# Patient Record
Sex: FEMALE | Race: ASIAN | Employment: OTHER | URBAN - METROPOLITAN AREA
[De-identification: names, ages, dates, MRNs, and addresses within clinical notes are randomized per-mention and may not be internally consistent; named-entity substitution may affect disease eponyms.]

---

## 2017-08-04 ENCOUNTER — HOSPITAL ENCOUNTER (OUTPATIENT)
Dept: MAMMOGRAPHY | Age: 72
Discharge: HOME | End: 2017-08-04
Attending: GENERAL PRACTICE
Payer: MEDICARE

## 2017-08-04 ENCOUNTER — HOSPITAL ENCOUNTER (OUTPATIENT)
Dept: BONE DENSITY | Age: 72
Discharge: HOME | End: 2017-08-04
Attending: GENERAL PRACTICE
Payer: MEDICARE

## 2017-08-04 DIAGNOSIS — Z12.31 VISIT FOR SCREENING MAMMOGRAM: ICD-10-CM

## 2017-08-04 DIAGNOSIS — M81.0 OSTEOPOROSIS: ICD-10-CM

## 2017-08-04 PROCEDURE — 77067 SCR MAMMO BI INCL CAD: CPT

## 2017-08-04 PROCEDURE — 77080 DXA BONE DENSITY AXIAL: CPT

## 2018-08-15 RX ORDER — CHOLECALCIFEROL (VITAMIN D3) 125 MCG
1 CAPSULE ORAL DAILY
COMMUNITY

## 2018-08-15 RX ORDER — LOSARTAN POTASSIUM 100 MG/1
1 TABLET ORAL DAILY
Refills: 3 | COMMUNITY
Start: 2018-07-02

## 2018-08-15 RX ORDER — ACETAMINOPHEN 325 MG/1
650 TABLET ORAL
COMMUNITY
End: 2019-10-23

## 2018-08-15 RX ORDER — TIMOLOL MALEATE 2.5 MG/ML
1 SOLUTION/ DROPS OPHTHALMIC 2 TIMES DAILY
Refills: 3 | COMMUNITY
Start: 2018-07-01

## 2018-08-15 NOTE — PERIOP NOTES
[facility]  Ambulatory Surgery Unit  Pre-operative Instructions for Endo Procedures    Procedure Date  8/22            Tentative Arrival Time 0830am      1. On the day of your procedure, please report to the Ambulatory Surgery Unit Registration Desk and sign in at your designated time. The Ambulatory Surgery Unit is located in [facility] on the [facility] side of the [facility] across from the [address] [place] [address]. Please have all of your health insurance cards and a photo ID. 2. You must have someone with you to drive you home, as you should not drive a car for 24 hours following anesthesia. Please make arrangements for a responsible adult friend or family member to stay with you for at least the first 24 hours after your procedure. 3. Do not have anything to eat or drink (including water, gum, mints, coffee, juice) after midnight   8/21. This may not apply to medications prescribed by your physician. (Please note below the special instructions with medications to take the morning of your procedure.)    4. If applicable, follow the clear liquid diet and bowel prep instructions provided by your physician's office. If you do not have this information, or have any questions, please contact your physician's office. 5. We recommend you do not drink any alcoholic beverages for 24 hours before and after your procedure. 6. Contact your surgeons office for instructions on the following medications: non-steroidal anti-inflammatory drugs (i.e. Advil, Aleve), vitamins, and supplements. (Some surgeons will want you to stop these medications prior to surgery and others may allow you to take them)   **If you are currently taking Plavix, Coumadin, Aspirin and/or other blood-thinning agents, contact your surgeon for instructions. ** Your surgeon will partner with the physician prescribing these medications to determine if it is safe to stop or if you need to continue taking.  Please do not stop taking these medications without instructions from your surgeon. 7. In an effort to help prevent surgical site infection, we ask that you shower with an anti-bacterial soap (i.e. Dial or Safeguard) on the morning of your procedure. Do not apply any lotions, powders, or deodorants after showering. 8. Wear comfortable clothes. Wear glasses instead of contacts. Do not bring any jewelry or money (other than copays or fees as instructed). Do not wear make-up, particularly mascara, the morning of your procedure. Wear your hair loose or down, no ponytails, buns, neelam pins or clips. All body piercings must be removed. 9. You should understand that if you do not follow these instructions your procedure may be cancelled. If your physical condition changes (i.e. fever, cold or flu) please contact your surgeon as soon as possible. 10. It is important that you be on time. If a situation occurs where you may be late, or if you have any questions or problems, please call [phone]. 11. Your procedure time may be subject to change. You will receive a phone call the day prior to confirm your arrival time. Special Instructions: Take all medications and inhalers, as prescribed, on the morning of surgery with a sip of water EXCEPT: none      I understand a pre-operative phone call will be made to verify my procedure time. In the event that I am not available, I give permission for a message to be left on my answering service and/or with another person? yes      Reviewed by phone with pt via [employer] interpretor [id number].   Verbalized understanding.   ___________________      ___________________      ___________________  (Signature of Patient)          (Witness)                   (Date and Time)

## 2018-08-21 ENCOUNTER — ANESTHESIA EVENT (OUTPATIENT)
Dept: SURGERY | Age: 73
End: 2018-08-21
Payer: MEDICARE

## 2018-08-22 ENCOUNTER — HOSPITAL ENCOUNTER (OUTPATIENT)
Age: 73
Setting detail: OUTPATIENT SURGERY
Discharge: HOME | End: 2018-08-22
Attending: COLON & RECTAL SURGERY | Admitting: COLON & RECTAL SURGERY
Payer: MEDICARE

## 2018-08-22 ENCOUNTER — ANESTHESIA (OUTPATIENT)
Dept: SURGERY | Age: 73
End: 2018-08-22
Payer: MEDICARE

## 2018-08-22 VITALS
HEART RATE: 58 BPM | SYSTOLIC BLOOD PRESSURE: 140 MMHG | RESPIRATION RATE: 19 BRPM | OXYGEN SATURATION: 100 % | DIASTOLIC BLOOD PRESSURE: 71 MMHG | TEMPERATURE: 97.5 F | HEIGHT: 59 IN | WEIGHT: 106 LBS | BODY MASS INDEX: 21.37 KG/M2

## 2018-08-22 PROBLEM — Z12.11 ENCOUNTER FOR SCREENING COLONOSCOPY: Status: ACTIVE | Noted: 2018-08-22

## 2018-08-22 PROCEDURE — 76210000046 HC AMBSU PH II REC FIRST 0.5 HR: Performed by: COLON & RECTAL SURGERY

## 2018-08-22 PROCEDURE — 76060000073 HC AMB SURG ANES FIRST 0.5 HR: Performed by: COLON & RECTAL SURGERY

## 2018-08-22 PROCEDURE — 74011250636 HC RX REV CODE- 250/636

## 2018-08-22 PROCEDURE — 74011250636 HC RX REV CODE- 250/636: Performed by: ANESTHESIOLOGY

## 2018-08-22 PROCEDURE — 76210000040 HC AMBSU PH I REC FIRST 0.5 HR: Performed by: COLON & RECTAL SURGERY

## 2018-08-22 PROCEDURE — 76030000002 HC AMB SURG OR TIME FIRST 0.: Performed by: COLON & RECTAL SURGERY

## 2018-08-22 PROCEDURE — 77030020255 HC SOL INJ LR 1000ML BG: Performed by: COLON & RECTAL SURGERY

## 2018-08-22 PROCEDURE — 77030021352 HC CBL LD SYS DISP COVD -B: Performed by: COLON & RECTAL SURGERY

## 2018-08-22 RX ORDER — SODIUM CHLORIDE, SODIUM LACTATE, POTASSIUM CHLORIDE, CALCIUM CHLORIDE 600; 310; 30; 20 MG/100ML; MG/100ML; MG/100ML; MG/100ML
25 INJECTION, SOLUTION INTRAVENOUS CONTINUOUS
Status: DISCONTINUED | OUTPATIENT
Start: 2018-08-22 | End: 2018-08-22 | Stop reason: HOSPADM

## 2018-08-22 RX ORDER — SODIUM CHLORIDE 0.9 % (FLUSH) 0.9 %
5-10 SYRINGE (ML) INJECTION AS NEEDED
Status: DISCONTINUED | OUTPATIENT
Start: 2018-08-22 | End: 2018-08-22 | Stop reason: HOSPADM

## 2018-08-22 RX ORDER — FENTANYL CITRATE 50 UG/ML
25 INJECTION, SOLUTION INTRAMUSCULAR; INTRAVENOUS
Status: DISCONTINUED | OUTPATIENT
Start: 2018-08-22 | End: 2018-08-22 | Stop reason: HOSPADM

## 2018-08-22 RX ORDER — PROPOFOL 10 MG/ML
INJECTION, EMULSION INTRAVENOUS AS NEEDED
Status: DISCONTINUED | OUTPATIENT
Start: 2018-08-22 | End: 2018-08-22 | Stop reason: HOSPADM

## 2018-08-22 RX ORDER — DIPHENHYDRAMINE HYDROCHLORIDE 50 MG/ML
12.5 INJECTION, SOLUTION INTRAMUSCULAR; INTRAVENOUS AS NEEDED
Status: DISCONTINUED | OUTPATIENT
Start: 2018-08-22 | End: 2018-08-22 | Stop reason: HOSPADM

## 2018-08-22 RX ORDER — SODIUM CHLORIDE 0.9 % (FLUSH) 0.9 %
5-10 SYRINGE (ML) INJECTION EVERY 8 HOURS
Status: DISCONTINUED | OUTPATIENT
Start: 2018-08-22 | End: 2018-08-22 | Stop reason: HOSPADM

## 2018-08-22 RX ORDER — LIDOCAINE HYDROCHLORIDE 10 MG/ML
0.1 INJECTION, SOLUTION EPIDURAL; INFILTRATION; INTRACAUDAL; PERINEURAL AS NEEDED
Status: DISCONTINUED | OUTPATIENT
Start: 2018-08-22 | End: 2018-08-22 | Stop reason: HOSPADM

## 2018-08-22 RX ORDER — ONDANSETRON 2 MG/ML
4 INJECTION INTRAMUSCULAR; INTRAVENOUS AS NEEDED
Status: DISCONTINUED | OUTPATIENT
Start: 2018-08-22 | End: 2018-08-22 | Stop reason: HOSPADM

## 2018-08-22 RX ADMIN — SODIUM CHLORIDE, SODIUM LACTATE, POTASSIUM CHLORIDE, AND CALCIUM CHLORIDE 25 ML/HR: 600; 310; 30; 20 INJECTION, SOLUTION INTRAVENOUS at 08:58

## 2018-08-22 RX ADMIN — PROPOFOL 120 MG: 10 INJECTION, EMULSION INTRAVENOUS at 10:41

## 2018-08-22 NOTE — PERIOP NOTES
Language line,  # [phone] used to sign consents. Pt also consented that  can interpret for her during her care.

## 2018-08-22 NOTE — DISCHARGE INSTRUCTIONS
Jermaine Shen Angry  [id number]  1945    COLON DISCHARGE INSTRUCTIONS  Discomfort:  Redness at IV site- apply warm compress to area; if redness or soreness persist- contact your physician  There may be a slight amount of blood passed from the rectum  Gaseous discomfort- walking, belching will help relieve any discomfort  You may not operate a vehicle for 24 hours  You may not engage in an occupation involving machinery or appliances for rest of today  You may not drink alcoholic beverages for at least 24 hours  Avoid making any critical decisions for at least 24 hour  DIET:   Regular diet. - however -  remember your colon is empty and a heavy meal will produce gas. Avoid these foods:  vegetables, fried / greasy foods, carbonated drinks for today     ACTIVITY:  You may not  resume your normal daily activities it is recommended that you spend the remainder of the day resting -  avoid any strenuous activity. CALL M.D. ANY SIGN OF:   Increasing pain, nausea, vomiting  Abdominal distension (swelling)  New increased bleeding (oral or rectal)  Fever (chills)  Pain in chest area  Bloody discharge from nose or mouth  Shortness of breath    You may  take any Advil, Aspirin, Ibuprofen, Motrin, Aleve, or Goodys or Tylenol as needed for pain. Post procedure diagnosis:  NEGATIVE EXAM  Follow-up Instructions:   Call Dr. Daria Peraza if any questions  Telephone #  [phone]  Additional instructions ;  followup exam as needed prn. Jermaine Shen Angry  [id number]  1945        DISCHARGE SUMMARY from Nurse    The following personal items collected during your admission are returned to you:   Dental Appliance: Dental Appliances: Uppers, With patient  Vision:    Hearing Aid:    Jewelry:    Clothing:    Other Valuables:    Valuables sent to safe:             DO NOT TAKE SLEEPING MEDICATIONS OR ANTIANXIETY MEDICATIONS WHILE TAKING NARCOTIC PAIN MEDICATIONS,  ESPECIALLY THE NIGHT OF ANESTHESIA.     CPAP PATIENTS BE SURE TO WEAR MACHINE WHENEVER NAPPING OR SLEEPING. DISCHARGE SUMMARY from Nurse    The following personal items collected during your admission are returned to you:   Dental Appliance: Dental Appliances: Uppers, With patient  Vision:    Hearing Aid:    Jewelry:    Clothing:    Other Valuables:    Valuables sent to safe:        PATIENT INSTRUCTIONS:    After General Anesthesia or Intravenous Sedation, for 24 hours or while taking prescription Narcotics:        Someone should be with you for the next 24 hours. For your own safety, a responsible adult must drive you home. · Limit your activities  · Recommended activity: Rest today, up with assistance today. Do not climb stairs or shower unattended for the next 24 hours. · Please start with a soft bland diet and advance as tolerated (no nausea) to regular diet. · If you have a sore throat you should try the following: fluids, warm salt water gargles, or throat lozenges. If it does not improve after several days please follow up with your primary physician. · Do not drive and operate hazardous machinery  · Do not make important personal or business decisions  · Do  not drink alcoholic beverages  · If you have not urinated within 8 hours after discharge, please contact your surgeon on call. Report the following to your surgeon:  · Excessive pain, swelling, redness or odor of or around the surgical area  · Temperature over 100.5  · Nausea and vomiting lasting longer than 4 hours or if unable to take medications  · Any signs of decreased circulation or nerve impairment to extremity: change in color, persistent  numbness, tingling, coldness or increase pain      · You will receive a Post Operative Call from one of the Recovery Room Nurses on the day after your surgery to check on you. It is very important for us to know how you are recovering after your surgery.  If you have an issue or need to speak with someone, please call your surgeon, do not wait for the post operative call. · You may receive an e-mail or letter in the mail from Lena regarding your experience with us in the Ambulatory Surgery Unit. Your feedback is valuable to us and we appreciate your participation in the survey. · If the above instructions are not adequate, please contact Connie Laughlin RN, [facility] anesthesia Nurse Manager or our Anesthesiologist, at [phone]. If you are having problems after your surgery, call the physician at his office number. · We wish you a speedy recovery ? What to do at Home:      *  Please give a list of your current medications to your Primary Care Provider. *  Please update this list whenever your medications are discontinued, doses are      changed, or new medications (including over-the-counter products) are added. *  Please carry medication information at all times in case of emergency situations. These are general instructions for a healthy lifestyle:    No smoking/ No tobacco products/ Avoid exposure to second hand smoke    Surgeon General's Warning:  Quitting smoking now greatly reduces serious risk to your health. Obesity, smoking, and sedentary lifestyle greatly increases your risk for illness    A healthy diet, regular physical exercise & weight monitoring are important for maintaining a healthy lifestyle    You may be retaining fluid if you have a history of heart failure or if you experience any of the following symptoms:  Weight gain of 3 pounds or more overnight or 5 pounds in a week, increased swelling in our hands or feet or shortness of breath while lying flat in bed. Please call your doctor as soon as you notice any of these symptoms; do not wait until your next office visit.     Recognize signs and symptoms of STROKE:    B - Balance  E - Eyes    F-  Face looks uneven    A-  Arms unable to move or move even    S-  Speech slurred or non-existent    T-  Time-call 911 as soon as signs and symptoms begin-DO NOT go Back to bed or wait to see if you get better-TIME IS BRAIN. If you have not received your influenza and/or pneumococcal vaccine, please follow up with your primary care physician. The discharge information has been reviewed with the patient and caregiver. The patient and caregiver verbalized understanding.

## 2018-08-22 NOTE — ANESTHESIA POSTPROCEDURE EVALUATION
Post-Anesthesia Evaluation and Assessment    Patient: Rm Landaverde MRN: [id number]  SSN: [id number]    YOB: 1945  Age: 74 y.o. Sex: female       Cardiovascular Function/Vital Signs  Visit Vitals    /71    Pulse (!) 58    Temp 36.4 °C (97.5 °F)    Resp 19    Ht 4' 11\" (1.499 m)    Wt 48.1 kg (106 lb)    SpO2 100%    Breastfeeding No    BMI 21.41 kg/m2       Patient is status post general, total IV anesthesia anesthesia for Procedure(s):  COLONOSCOPY. Nausea/Vomiting: None    Postoperative hydration reviewed and adequate. Pain:  Pain Scale 1: Numeric (0 - 10) (08/22/18 1058)  Pain Intensity 1: 0 (08/22/18 1058)   Managed    Neurological Status:   Neuro (WDL): Within Defined Limits (08/22/18 1058)  Neuro  LUE Motor Response: Purposeful (08/22/18 1058)  LLE Motor Response: Purposeful (08/22/18 1058)  RUE Motor Response: Purposeful (08/22/18 1058)  RLE Motor Response: Purposeful (08/22/18 1058)   At baseline    Mental Status and Level of Consciousness: Arousable    Pulmonary Status:   O2 Device: Room air (08/22/18 1058)   Adequate oxygenation and airway patent    Complications related to anesthesia: None    Post-anesthesia assessment completed.  No concerns    Signed By: Laureen Howard MD     August 22, 2018

## 2018-08-22 NOTE — H&P
[address]  HISTORY AND PHYSICAL      Name:Dusty Watts  MR#: [id number]  : 1945  ACCOUNT #: [de-identified]   ADMIT DATE: 2018    CHIEF COMPLAINT:  Screening colonoscopy. HISTORY OF PRESENT ILLNESS:  The patient is in for screening colonoscopy. She is of [facility] descent, speaks very little [place], and her  is the primary communicator. We have found that she has bowel movements on a daily basis as a general rule. She has no nausea, vomiting, constipation, diarrhea, fever or chills. She has no abdominal, pelvic or anal outlet pain. Her weight is stable. She has no rectal bleeding and her family history is negative. PAST SURGICAL HISTORY:  Significant for removal of 1 ovary. MEDICATIONS:  Include losartan. MEDICAL CONDITIONS:  Hypertension. ALLERGIES:  NONE. SOCIAL HISTORY:  Does not drink or smoke. FAMILY HISTORY:  Negative. No previous colonic history. REVIEW OF SYSTEMS:  Completely negative with the exception of some joint pains. PHYSICAL EXAMINATION:  GENERAL:  Reveals a very trim, pleasant 57-year-old [facility] female in no acute distress. VITAL SIGNS:  Her blood pressure is 130/70. Height is 4 feet 10 inches, weight is 109 pounds. ENT:  Clear. NECK:  Supple. CHEST:  Clear. CARDIAC:  Regular without failure. BREASTS:  Deferred. ABDOMEN:  Soft and nontender. No mass or organomegaly. Bowel sounds are active. Groins negative. RECTAL:  Will be done at time of colonoscopy. EXTREMITIES:  No gross deformity. NEUROLOGIC:  Intact. ASSESSMENT:  A 57-year-old [facility] female for screening colonoscopy. Her last exam was over 10 years ago by her and her 's reports. RECOMMENDATION AND PLAN:  We will move forward with colonoscopy today. She is aware of the risks including bleeding, perforation and the risk of missing small polyps, and she is agreeable to proceed.       MD PARVEZ Boston/MILY  D: 2018 22:46     T: 08/22/2018 00:01  JOB #: [id number]  CC: Shira Marr MD  CC: Estefani Oconnor DO

## 2018-08-22 NOTE — INTERVAL H&P NOTE
H&P Update:  Juliana Barnard was seen and examined. History and physical has been reviewed. The patient has been examined.  There have been no significant clinical changes since the completion of the originally dated History and Physical.    Signed By: Pretty Mancilla MD     August 22, 2018 10:13 AM

## 2018-08-22 NOTE — PERIOP NOTES
Pt discharged via wheelchair to car, accompanied by RN. Pt discharged awake and alert, respirations equal and unlabored, skin warm, dry, and intact. Pt and family members' questions and concerns addressed prior to discharge.

## 2018-08-22 NOTE — BRIEF OP NOTE
BRIEF OPERATIVE NOTE    Date of Procedure: 8/22/2018   Preoperative Diagnosis: SCREENING  Postoperative Diagnosis: NEGATIVE EXAM    Procedure(s):  COLONOSCOPY  Surgeon(s) and Role:     * Olga Pretty MD - Primary         Surgical Assistant: none    Surgical Staff:  Circ-1: Alexander Garibay RN  Circ-2: Michel Oneill RN  Scrub Tech-1: Manish Schilling  Event Time In   Incision Start 1027   Incision Close 1041     Anesthesia: MAC   Estimated Blood Loss: none  Specimens: * No specimens in log *   Findings: normal exam   Complications: none  Implants: * No implants in log *

## 2018-08-22 NOTE — PERIOP NOTES
Susna Conrad Ly  1945  [id number]    Situation:  Verbal report given from: VENKATESH Warren RN  Procedure: Procedure(s):  COLONOSCOPY    Background:    Preoperative diagnosis: SCREENING    Postoperative diagnosis: NEGATIVE EXAM    :  Dr. Tobias Goldman    Assistant(s): Circ-1: Kristy Eastman RN  Circ-2: Rani Pichardo RN  Scrub Tech-1: Dl Mg    Specimens: * No specimens in log *    Assessment:  Intra-procedure medications   Propofol 120 mg      Anesthesia gave intra-procedure sedation and medications, see anesthesia flow sheet     Intravenous fluids: LR@ KVO     Vital signs stable     Abdominal assessment: round and soft       Recommendation:    Permission to share finding with Le yes    All side rails up, bed in low position, wheels locked. Nurse at bedside.

## 2018-08-22 NOTE — OP NOTES
[address] [facility]  MR#: [id number]  : 1945  ACCOUNT #: [de-identified]   DATE OF SERVICE: 2018    PREOPERATIVE DIAGNOSIS:  A 10-year interval screening colonoscopy. POSTOPERATIVE DIAGNOSIS:  A 10-year interval screening colonoscopy, with normal exam to cecum. PROCEDURE PERFORMED:  Total colonoscopy to cecum. SURGEON:  Reny Infante MD    ANESTHESIA:  IV sedation with propofol per Anesthesia. ESTIMATED BLOOD LOSS:  None    SPECIMENS REMOVED:  None    COMPLICATIONS:  None    INDICATIONS:  Patient is a 78-year-old  woman who is in today for a screening colonoscopy. She is currently asymptomatic. She is aware of the risks of the colonoscopy including bleeding, perforation, and the risk of missing small polyps, and she was agreeable to proceed. We will move forward with exam today. DESCRIPTION OF PROCEDURE:  After uneventful induction of IV sedation the patient was placed in the left lateral position, and the pediatric 180 stiffening scope passed through the colon to the cecal cap without difficulty. Position was confirmed with light reflex and local anatomic landmarks. Cecal cap and ileocecal valve were normal.  Photograph was obtained to document location and anatomy. Scope was slowly and carefully pulled back. The ascending, transverse, descending, and sigmoid colon were all normal.  There was no evidence of any diverticular disease, and there was no evidence of any polyps. No mucosal inflammation was present. The scope was returned back down to the rectum, which was normal.  Anal canal is negative. Air was aspirated, scope was removed, and the exam terminated. She tolerated the exam well, and remained stable and left with a benign abdomen.   Ordinarily, she would undergo a followup exam in 10 years, but in 10 years she will be 78years old, and should only go forward with the exam at that time if she is symptomatic or there is some other extraneous reason mandating exam.  Otherwise, we would follow her back on a p.r.n. basis.       MD PARVEZ Tobin / Lisa.Kavin  D: 08/22/2018 10:58     T: 08/22/2018 17:50  JOB #: [id number]  CC: Chris Hodges MD  CC: Tay Go MD

## 2018-08-22 NOTE — IP AVS SNAPSHOT
[address] [facility] [address]. 
[phone] Patient: Ivy Juarez MRN: [id number] BCP:[id number]/88/[id number] About your hospitalization You were admitted on:  August 22, 2018 You last received care in the:  [facility] [place] [facility] You were discharged on:  August 22, 2018 Why you were hospitalized Your primary diagnosis was:  Encounter For Screening Colonoscopy Follow-up Information Follow up With Details Comments Contact Info Tahmina Emery MD   [address] [id number] [phone] Discharge Orders None A check laurie indicates which time of day the medication should be taken. My Medications CONTINUE taking these medications Instructions Each Dose to Equal  
 Morning Noon Evening Bedtime CALCIUM 600 + D 600-125 mg-unit Tab Generic drug:  calcium-cholecalciferol (d3) Your last dose was: Your next dose is: Take 1 Tab by mouth daily. 1 Tab  
    
   
   
   
  
 losartan 100 mg tablet Commonly known as:  COZAAR Your last dose was: Your next dose is: Take 1 Tab by mouth daily. Indications: hypertension 1 Tab  
    
   
   
   
  
 timolol 0.25 % ophthalmic solution Commonly known as:  TIMOPTIC Your last dose was: Your next dose is:    
   
   
 Administer 1 Drop to both eyes two (2) times a day. 1 Drop TYLENOL 325 mg tablet Generic drug:  acetaminophen Your last dose was: Your next dose is: Take 650 mg by mouth every four (4) hours as needed for Pain. 650 mg  
    
   
   
   
  
 VITAMIN D3 2,000 unit Tab Generic drug:  cholecalciferol (vitamin D3) Your last dose was: Your next dose is: Take 1 Tab by mouth daily. 1 Tab Discharge Instructions Andre Martínez 
[id number] 1945 COLON DISCHARGE INSTRUCTIONS Discomfort: 
Redness at IV site- apply warm compress to area; if redness or soreness persist- contact your physician There may be a slight amount of blood passed from the rectum Gaseous discomfort- walking, belching will help relieve any discomfort You may not operate a vehicle for 24 hours You may not engage in an occupation involving machinery or appliances for rest of today You may not drink alcoholic beverages for at least 24 hours Avoid making any critical decisions for at least 24 hour DIET: 
 Regular diet.  however -  remember your colon is empty and a heavy meal will produce gas. Avoid these foods:  vegetables, fried / greasy foods, carbonated drinks for today ACTIVITY: 
You may not  resume your normal daily activities it is recommended that you spend the remainder of the day resting -  avoid any strenuous activity. CALL M.D. ANY SIGN OF: Increasing pain, nausea, vomiting Abdominal distension (swelling) New increased bleeding (oral or rectal) Fever (chills) Pain in chest area Bloody discharge from nose or mouth Shortness of breath You may  take any Advil, Aspirin, Ibuprofen, Motrin, Aleve, or Goodys or Tylenol as needed for pain. Post procedure diagnosis:  NEGATIVE EXAM 
Follow-up Instructions: 
 Call Dr. Munoz Adjmarlin if any questions Telephone #  [phone] Additional instructions ;  followup exam as needed conner Martínez 
[id number] 1945 DISCHARGE SUMMARY from Nurse The following personal items collected during your admission are returned to you:  
Dental Appliance: Dental Appliances: Uppers, With patient Vision:   
Hearing Aid:   
Jewelry:   
Clothing:   
Other Valuables:   
Valuables sent to safe:    
 
 
 
 
DO NOT TAKE SLEEPING MEDICATIONS OR ANTIANXIETY MEDICATIONS WHILE TAKING NARCOTIC PAIN MEDICATIONS,  ESPECIALLY THE NIGHT OF ANESTHESIA. CPAP PATIENTS BE SURE TO WEAR MACHINE WHENEVER NAPPING OR SLEEPING. DISCHARGE SUMMARY from Nurse The following personal items collected during your admission are returned to you:  
Dental Appliance: Dental Appliances: Uppers, With patient Vision:   
Hearing Aid:   
Jewelry:   
Clothing:   
Other Valuables:   
Valuables sent to safe:   
 
 
PATIENT INSTRUCTIONS: 
 
 
B - Balance E - Eyes F-  Face looks uneven A-  Arms unable to move or move even S-  Speech slurred or non-existent T-  Time-call 911 as soon as signs and symptoms begin-DO NOT go Back to bed or wait to see if you get better-TIME IS BRAIN. If you have not received your influenza and/or pneumococcal vaccine, please follow up with your primary care physician. The discharge information has been reviewed with the patient and caregiver. The patient and caregiver verbalized understanding. Introducing \[facility] [place] [facility]\"" & HEALTH SERVICES! Yuo Zelaya introduces YOOWALK patient portal. Now you can access parts of your medical record, email your doctor's office, and request medication refills online. 1. In your internet browser, go to [email or web address]://[email or web address]. aCon/[employer] 2. Click on the First Time User? Click Here link in the Sign In box. You will see the New Member Sign Up page. 3. Enter your YOOWALK Access Code exactly as it appears below. You will not need to use this code after youve completed the sign-up process. If you do not sign up before the expiration date, you must request a new code. · YOOWALK Access Code: [email or web address] Expires: 11/20/2018  8:16 AM 
 
4. Enter the last four digits of your Social Security Number (xxxx) and Date of Birth (mm/dd/yyyy) as indicated and click Submit. You will be taken to the next sign-up page. 5. Create a YOOWALK ID. This will be your YOOWALK login ID and cannot be changed, so think of one that is secure and easy to remember. 6. Create a YOOWALK password. You can change your password at any time. 7. Enter your Password Reset Question and Answer. This can be used at a later time if you forget your password. 8. Enter your e-mail address. You will receive e-mail notification when new information is available in [address] 9. Click Sign Up. You can now view and download portions of your medical record. 10. Click the Download Summary menu link to download a portable copy of your medical information. If you have questions, please visit the Frequently Asked Questions section of the MyChart website. Remember, Emergency CallWorks is NOT to be used for urgent needs. For medical emergencies, dial 911. Now available from your iPhone and Android! Introducing Yuniel Haines As a [facility] patient, I wanted to make you aware of our electronic visit tool called Yuniel Haines. Certona allows you to connect within minutes with a medical provider 24 hours a day, seven days a week via a mobile device or tablet or logging into a secure website from your computer. You can access Yuniel Haines from anywhere in the [place]. A virtual visit might be right for you when you have a simple condition and feel like you just dont want to get out of bed, or cant get away from work for an appointment, when your regular [facility] provider is not available (evenings, weekends or holidays), or when youre out of town and need minor care. Electronic visits cost only $49 and if the [facility] [employer] provider determines a prescription is needed to treat your condition, one can be electronically transmitted to a nearby pharmacy*. Please take a moment to enroll today if you have not already done so. The enrollment process is free and takes just a few minutes. To enroll, please download the Certona anuja to your tablet or phone, or visit [email or web address] to enroll on your computer. And, as an [address] [place] [address] patient with a PulseOn account, the results of your visits will be scanned into your electronic medical record and your primary care provider will be able to view the scanned results. We urge you to continue to see your regular [facility] provider for your ongoing medical care.   And while your primary care provider may not be the one available when you seek a Yuniel Haines virtual visit, the peace of mind you get from getting a real diagnosis real time can be priceless. For more information on Yuniel Haines, view our Frequently Asked Questions (FAQs) at [email or web address]. Sincerely, 
 
Quan Neil MD 
Chief Medical Officer [employer] *:  certain medications cannot be prescribed via Yuniel Haines Providers Seen During Your Hospitalization Provider Specialty Primary office phone Sandhya Darnell MD Colon and Rectal Surgery [phone] Your Primary Care Physician (PCP) Primary Care Physician Office Phone Office Fax Rowanorlando [place]ino [phone] You are allergic to the following No active allergies Recent Documentation Height Weight Breastfeeding? BMI OB Status Smoking Status 1.499 m 48.1 kg No 21.41 kg/m2 Postmenopausal Never Smoker Emergency Contacts Name Discharge Info Relation Home Work Mobile Renetta Koenig  Spouse [3] [phone] Patient Belongings The following personal items are in your possession at time of discharge: 
  Dental Appliances: Uppers, With patient Please provide this summary of care documentation to your next provider. Signatures-by signing, you are acknowledging that this After Visit Summary has been reviewed with you and you have received a copy. Patient Signature:  ____________________________________________________________ Date:  ____________________________________________________________  
  
[facility] Provider Signature:  ____________________________________________________________ Date:  ____________________________________________________________

## 2018-08-22 NOTE — ANESTHESIA PREPROCEDURE EVALUATION
Anesthetic History   No history of anesthetic complications            Review of Systems / Medical History  Patient summary reviewed, nursing notes reviewed and pertinent labs reviewed    Pulmonary  Within defined limits                 Neuro/Psych   Within defined limits           Cardiovascular    Hypertension              Exercise tolerance: >4 METS     GI/Hepatic/Renal  Within defined limits              Endo/Other  Within defined limits           Other Findings   Comments: Decreased vision right eye  [place]; speaks little [place]           Physical Exam    Airway  Mallampati: II  TM Distance: 4 - 6 cm    Mouth opening: Normal     Cardiovascular    Rhythm: regular  Rate: normal      Pertinent negatives: No murmur   Dental    Dentition: Full upper dentures and Full lower dentures     Pulmonary  Breath sounds clear to auscultation               Abdominal  GI exam deferred       Other Findings            Anesthetic Plan    ASA: 2  Anesthesia type: general and total IV anesthesia          Induction: Intravenous  Anesthetic plan and risks discussed with: Patient and Spouse       assisted with translation for H&P. Rerec[employer] phone used for consents.

## 2018-08-22 NOTE — PERIOP NOTES
Patient: George Muñiz MRN: [id number]  SSN: [id number]   YOB: 1945  Age: 74 y.o. Sex: female     Patient is status post Procedure(s):  COLONOSCOPY.     Surgeon(s) and Role:     * Fanta Gomez MD - Primary                Peripheral IV 08/22/18 Right Hand (Active)   Site Assessment Clean, dry, & intact 8/22/2018  8:57 AM   Phlebitis Assessment 0 8/22/2018  8:57 AM   Infiltration Assessment 0 8/22/2018  8:57 AM   Dressing Status New 8/22/2018  8:57 AM   Dressing Type Transparent;Tape 8/22/2018  8:57 AM   Hub Color/Line Status Blue 8/22/2018  8:57 AM                     Dressing/Packing:   NONE    Other:  Endoscope was pre-cleaned at bedside immediately by [facility]

## 2018-08-22 NOTE — PERIOP NOTES
Permission received to review discharge instructions and discuss private health information with Alejandra Goodell, .

## 2019-09-24 PROBLEM — Z12.11 ENCOUNTER FOR SCREENING COLONOSCOPY: Status: RESOLVED | Noted: 2018-08-22 | Resolved: 2019-09-24

## 2019-10-07 ENCOUNTER — HOSPITAL ENCOUNTER (OUTPATIENT)
Dept: PREADMISSION TESTING | Age: 74
Discharge: HOME | End: 2019-10-07
Attending: ORTHOPAEDIC SURGERY
Payer: MEDICARE

## 2019-10-07 VITALS
OXYGEN SATURATION: 100 % | DIASTOLIC BLOOD PRESSURE: 60 MMHG | HEIGHT: 56 IN | TEMPERATURE: 97.7 F | WEIGHT: 111.55 LBS | SYSTOLIC BLOOD PRESSURE: 152 MMHG | HEART RATE: 59 BPM | BODY MASS INDEX: 25.09 KG/M2 | RESPIRATION RATE: 16 BRPM

## 2019-10-07 LAB
ABO + RH BLD: NORMAL
ALBUMIN SERPL-MCNC: 4.2 G/DL (ref 3.5–5)
ALBUMIN/GLOB SERPL: 0.9 {RATIO} (ref 1.1–2.2)
ALP SERPL-CCNC: 78 U/L (ref 45–117)
ALT SERPL-CCNC: 42 U/L (ref 12–78)
ANION GAP SERPL CALC-SCNC: ABNORMAL MMOL/L (ref 5–15)
APPEARANCE UR: CLEAR
AST SERPL-CCNC: 32 U/L (ref 15–37)
ATRIAL RATE: 56 BPM
BACTERIA URNS QL MICRO: NEGATIVE /HPF
BILIRUB SERPL-MCNC: 0.7 MG/DL (ref 0.2–1)
BILIRUB UR QL: NEGATIVE
BLOOD GROUP ANTIBODIES SERPL: NORMAL
BUN SERPL-MCNC: 13 MG/DL (ref 6–20)
BUN/CREAT SERPL: 16 (ref 12–20)
CALCIUM SERPL-MCNC: 9.7 MG/DL (ref 8.5–10.1)
CALCULATED P AXIS, ECG09: 62 DEGREES
CALCULATED R AXIS, ECG10: -28 DEGREES
CALCULATED T AXIS, ECG11: 44 DEGREES
CHLORIDE SERPL-SCNC: 107 MMOL/L (ref 97–108)
CO2 SERPL-SCNC: 32 MMOL/L (ref 21–32)
COLOR UR: NORMAL
CREAT SERPL-MCNC: 0.82 MG/DL (ref 0.55–1.02)
DIAGNOSIS, 93000: NORMAL
EPITH CASTS URNS QL MICRO: NORMAL /LPF
ERYTHROCYTE [DISTWIDTH] IN BLOOD BY AUTOMATED COUNT: 12.4 % (ref 11.5–14.5)
EST. AVERAGE GLUCOSE BLD GHB EST-MCNC: 117 MG/DL
GLOBULIN SER CALC-MCNC: 4.5 G/DL (ref 2–4)
GLUCOSE SERPL-MCNC: 85 MG/DL (ref 65–100)
GLUCOSE UR STRIP.AUTO-MCNC: NEGATIVE MG/DL
HBA1C MFR BLD: 5.7 % (ref 4.2–6.3)
HCT VFR BLD AUTO: 44.3 % (ref 35–47)
HGB BLD-MCNC: 14.3 G/DL (ref 11.5–16)
HGB UR QL STRIP: NEGATIVE
HYALINE CASTS URNS QL MICRO: NORMAL /LPF (ref 0–5)
INR PPP: 1 (ref 0.9–1.1)
KETONES UR QL STRIP.AUTO: NEGATIVE MG/DL
LEUKOCYTE ESTERASE UR QL STRIP.AUTO: NEGATIVE
MCH RBC QN AUTO: 31.8 PG (ref 26–34)
MCHC RBC AUTO-ENTMCNC: 32.3 G/DL (ref 30–36.5)
MCV RBC AUTO: 98.7 FL (ref 80–99)
NITRITE UR QL STRIP.AUTO: NEGATIVE
NRBC # BLD: 0 K/UL (ref 0–0.01)
NRBC BLD-RTO: 0 PER 100 WBC
P-R INTERVAL, ECG05: 160 MS
PH UR STRIP: 7 [PH] (ref 5–8)
PLATELET # BLD AUTO: 210 K/UL (ref 150–400)
PMV BLD AUTO: 9.4 FL (ref 8.9–12.9)
POTASSIUM SERPL-SCNC: 4.9 MMOL/L (ref 3.5–5.1)
PROT SERPL-MCNC: 8.7 G/DL (ref 6.4–8.2)
PROT UR STRIP-MCNC: NEGATIVE MG/DL
PROTHROMBIN TIME: 9.8 SEC (ref 9–11.1)
Q-T INTERVAL, ECG07: 468 MS
QRS DURATION, ECG06: 72 MS
QTC CALCULATION (BEZET), ECG08: 451 MS
RBC # BLD AUTO: 4.49 M/UL (ref 3.8–5.2)
RBC #/AREA URNS HPF: NORMAL /HPF (ref 0–5)
SODIUM SERPL-SCNC: 138 MMOL/L (ref 136–145)
SP GR UR REFRACTOMETRY: 1 (ref 1–1.03)
SPECIMEN EXP DATE BLD: NORMAL
UA: UC IF INDICATED,UAUC: NORMAL
UROBILINOGEN UR QL STRIP.AUTO: 0.2 EU/DL (ref 0.2–1)
VENTRICULAR RATE, ECG03: 56 BPM
WBC # BLD AUTO: 5.4 K/UL (ref 3.6–11)
WBC URNS QL MICRO: NORMAL /HPF (ref 0–4)

## 2019-10-07 PROCEDURE — 80053 COMPREHEN METABOLIC PANEL: CPT

## 2019-10-07 PROCEDURE — 36415 COLL VENOUS BLD VENIPUNCTURE: CPT

## 2019-10-07 PROCEDURE — 86900 BLOOD TYPING SEROLOGIC ABO: CPT

## 2019-10-07 PROCEDURE — 83036 HEMOGLOBIN GLYCOSYLATED A1C: CPT

## 2019-10-07 PROCEDURE — 93005 ELECTROCARDIOGRAM TRACING: CPT

## 2019-10-07 PROCEDURE — 85027 COMPLETE CBC AUTOMATED: CPT

## 2019-10-07 PROCEDURE — 85610 PROTHROMBIN TIME: CPT

## 2019-10-07 PROCEDURE — 81001 URINALYSIS AUTO W/SCOPE: CPT

## 2019-10-07 NOTE — PERIOP NOTES
[facility]  Joint/Spine Preoperative Instructions        Surgery Date  10/21/2019        Time of Arrival 1030 am Contact # alexus Tanner-   [phone]    4. On the day of your surgery, please report to the Surgical Services Registration Desk and sign in at your designated time. The Surgery Center is located to the right of the Emergency Room. 2. You must have someone with you to drive you home. You should not drive a car for 24 hours following surgery. Please make arrangements for a friend or family member to stay with you for the first 24 hours after your surgery. 3. No food after midnight . Medications morning of surgery should be taken with a sip of water. Please follow pre-surgery drink instructions that were given at your Pre Admission Testing appointment. 4. We recommend you do not drink any alcoholic beverages for 24 hours before and after your surgery. 5. Contact your surgeons office for instructions on the following medications: non-steroidal anti-inflammatory drugs (i.e. Advil, Aleve), vitamins, and supplements. (Some surgeons will want you to stop these medications prior to surgery and others may allow you to take them)  **If you are currently taking Plavix, Coumadin, Aspirin and/or other blood-thinning agents, contact your surgeon for instructions. ** Your surgeon will partner with the physician prescribing these medications to determine if it is safe to stop or if you need to continue taking. Please do not stop taking these medications without instructions from your surgeon    6. Wear comfortable clothes. Wear glasses instead of contacts. Do not bring any money or jewelry. Please bring picture ID, insurance card, and any prearranged co-payment or hospital payment. Do not wear make-up, particularly mascara the morning of your surgery. Do not wear nail polish, particularly if you are having foot /hand surgery.   Wear your hair loose or down, no ponytails, buns, neelam pins or clips. All body piercings must be removed. Please shower with antibacterial soap for three consecutive days before and on the morning of surgery, but do not apply any lotions, powders or deodorants after the shower on the day of surgery. Please use a fresh towels after each shower. Please sleep in clean clothes and change bed linens the night before surgery. Please do not shave for 48 hours prior to surgery. Shaving of the face is acceptable. 7. You should understand that if you do not follow these instructions your surgery may be cancelled. If your physical condition changes (I.e. fever, cold or flu) please contact your surgeon as soon as possible. 8. It is important that you be on time. If a situation occurs where you may be late, please call [phone] (OR Holding Area). 9. If you have any questions and or problems, please call [phone] (Pre-admission Testing). 10. Your surgery time may be subject to change. You will receive a phone call the evening prior if your time changes. 11.  If having outpatient surgery, you must have someone to drive you here, stay with you during the duration of your stay, and to drive you home at time of discharge. 12.   In an effort to improve the efficiency, privacy, and safety for all of our Pre-op patients visitors are not allowed in the Holding area. Once you arrive and are registered your family/visitors will be asked to remain in the waiting room. The Pre-op staff will get you from the Surgical Waiting Area and will explain to you and your family/visitors that the Pre-op phase is beginning. The staff will answer any questions and provide instructions for tracking of the patient, by use of the existing tracking number and color-coded status board in the waiting room.   At this time the staff will also ask for your designated spokesperson information in the event that the physician or staff need to provide an update or obtain any pertinent information. The designated spokesperson will be notified if the physician needs to speak to family during the pre-operative phase. If at any time your family/visitors has questions or concerns they may approach the volunteer desk in the waiting area for assistance. Special Instructions:    MEDICATIONS TO TAKE THE MORNING OF SURGERY WITH A SIP OF WATER:tylenol if needed, timoptic eye drops       I understand a pre-operative phone call will be made to verify my surgery time. In the event that I am not available, I give permission for a message to be left on my answering service and/or with another person?   yes         ___________________      __________   _________    (Signature of Patient)             (Witness)                (Date and Time)

## 2019-10-07 NOTE — PERIOP NOTES
Called [facility]  Line to review surgery consent and blood consent with patient. [address]  # [phone].

## 2019-10-07 NOTE — PERIOP NOTES
Incentive Spirometer        Using the incentive spirometer helps expand the small air sacs of your lungs, helps you breathe deeply, and helps improve your lung function. Use your incentive spirometer twice a day (10 breaths each time) prior to surgery. How to Use Your Incentive Spirometer:  1. Hold the incentive spirometer in an upright position. 2. Breathe out as usual.   3. Place the mouthpiece in your mouth and seal your lips tightly around it. 4. Take a deep breath. Breathe in slowly and as deeply as possible. Keep the blue flow rate guide between the arrows. 5. Hold your breath as long as possible. Then exhale slowly and allow the piston to fall to the bottom of the column. 6. Rest for a few seconds and repeat steps one through five at least 10 times. PAT Tidal Volume__1750________________  x__2______________  Date__10/07/2019_____________________    Marlene Arvizuis THE INCENTIVE SPIROMETER WITH YOU TO THE HOSPITAL ON THE DAY OF YOUR SURGERY. Opportunity given to ask and answer questions as well as to observe return demonstration.     Patient signature_____________________________    Witness____________________________

## 2019-10-07 NOTE — PERIOP NOTES
Orthopedic and Spine Patients: Instructions on When You Can    Eat or Drink Before Surgery    You were given 2 pre-surgery drinks at your pre-admission testing appointment.   Night before surgery:    o Drink one pre-surgery bottle at bedtime. o  No Food after midnight!   Day of Surgery:    o  Drink the 2nd  pre-surgery bottle 1 hour before you get to the hospital.        For questions call Pre-Admission Testing at [phone]. They are available from 8:00 am-5:00 pm, Monday through Friday.

## 2019-10-07 NOTE — PERIOP NOTES
Preventing Infections Before and After - Your Surgery    IMPORTANT INSTRUCTIONS    Please read and follow these instructions carefully. If you are unable to comply with the below instructions your procedure will be cancelled. Every Night for Three (3) nights before your surgery:  1. Shower with an antibacterial soap, such as Dial, or the soap provided at your preassessment appointment. A shower is better than a bath for cleaning your skin. 2. If needed, ask someone to help you reach all areas of your body. Dont forget to clean your belly button with every shower. The night before your surgery: If you lose your Hibiclens please contact surgery center or you can purchase it at a local pharmacy  1. On the night before your surgery, shower with an antibacterial soap, such as Dial, or the soap provided at your preassessment appointment. 2. With one packet of Hibiclens in hand, turn water off.  3. Apply Hibiclens antiseptic skin cleanser with a clean, freshly washed washcloth. ? Gently apply to your body from chin to toes (except the genital area) and especially the area(s) where your incision(s) will be. ? Leave Hibiclens on your skin for at least 20 seconds. CAUTION: If needed, Hibiclens may be used to clean the folds of skin of the legs (such as in the area of the groin) and on your buttocks and hips. However, do not use Hibiclens above the neck or in the genital area (your bottom) or put inside any area of your body. 4. Turn the water back on and rinse. 5. Dry gently with a clean, freshly washed towel. 6. After your shower, do not use any powder, deodorant, perfumes or lotion. 7. Use clean, freshly washed towels and washcloths every time you shower. 8. Wear clean, freshly washed pajamas to bed the night before surgery. 9. Sleep on clean, freshly washed sheets. 10. Do not allow pets to sleep in your bed with you. The Morning of your surgery:  1.  Shower again thoroughly with an antibacterial soap, such as Dial or the soap provided at your preassessment appointment. If needed, ask someone for help to reach all areas of your body. Dont forget to clean your belly button! Rinse. 2. Dry gently with a clean, freshly washed towel. 3. After your shower, do not use any powder, deodorant, perfumes or lotion prior to surgery. 4. Put on clean, freshly washed clothing. Tips to help prevent infections after your surgery:  1. Protect your surgical wound from germs:  ? Hand washing is the most important thing you and your caregivers can do to prevent infections. ? Keep your bandage clean and dry! ? Do not touch your surgical wound. 2. Use clean, freshly washed towels and washcloths every time you shower; do not share bath linens with others. 3. Until your surgical wound is healed, wear clothing and sleep on bed linens each day that are clean and freshly washed. 4. Do not allow pets to sleep in your bed with you or touch your surgical wound. 5. Do not smoke - smoking delays wound healing. This may be a good time to stop smoking. 6. If you have diabetes, it is important for you to manage your blood sugar levels properly before your surgery as well as after your surgery. Poorly managed blood sugar levels slow down wound healing and prevent you from healing completely. If you lose your Hibiclens, please call the [facility], or it is available for purchase at your pharmacy.                ___________________      ___________________      ________________  (Signature of Patient)          (Witness)                   (Date and Time)

## 2019-10-08 LAB
BACTERIA SPEC CULT: NORMAL
BACTERIA SPEC CULT: NORMAL
SERVICE CMNT-IMP: NORMAL

## 2019-10-08 RX ORDER — CELECOXIB 100 MG/1
200 CAPSULE ORAL ONCE
Status: CANCELLED | OUTPATIENT
Start: 2019-10-21 | End: 2019-10-21

## 2019-10-08 RX ORDER — HEPARIN SODIUM 1000 [USP'U]/ML
1000 INJECTION, SOLUTION INTRAVENOUS; SUBCUTANEOUS ONCE
Status: CANCELLED | OUTPATIENT
Start: 2019-10-21 | End: 2019-10-21

## 2019-10-08 RX ORDER — ACETAMINOPHEN 500 MG
1000 TABLET ORAL ONCE
Status: CANCELLED | OUTPATIENT
Start: 2019-10-21 | End: 2019-10-21

## 2019-10-08 RX ORDER — CEFAZOLIN SODIUM/WATER 2 G/20 ML
2 SYRINGE (ML) INTRAVENOUS ONCE
Status: CANCELLED | OUTPATIENT
Start: 2019-10-21 | End: 2019-10-21

## 2019-10-08 RX ORDER — PREGABALIN 25 MG/1
75 CAPSULE ORAL ONCE
Status: CANCELLED | OUTPATIENT
Start: 2019-10-21 | End: 2019-10-21

## 2019-10-08 NOTE — ADVANCED PRACTICE NURSE
PAT Nurse Practitioner   Pre-Operative Chart Review/Assessment:-ORTHOPEDIC/NEUROSURGICAL SPINE                Patient Name:  Jori Alberts                                                         Age:   66 y.o.    :  1945     Today's Date:  10/8/2019     Date of PAT:   10/7/19      Date of Surgery:    10/21/19     Procedure(s):   right Total Knee Replacement     Surgeon:   Brandi Reardon     Medical Clearance:  Dr. Narendra Mosher:      1)  Cardiac Clearance:  Not requested       2)  Diabetic Treatment Consult:  Not indicated-A1C 5.7      3)  Sleep Apnea evaluation:   Not indicated-MARY CARMEN  2      4) Treatment for MRSA/Staph Aureus:  Negative      5) Additional Concerns:  Communication issues (speaks very little English)                 Vital Signs:         Vitals:    10/07/19 1112   BP: 152/60   Pulse: (!) 59   Resp: 16   Temp: 97.7 °F (36.5 °C)   SpO2: 100%   Weight: 50.6 kg (111 lb 8.8 oz)   Height: 4' 7.75\" (1.416 m)            ____________________________________________  PAST MEDICAL HISTORY  Past Medical History:   Diagnosis Date    Arthritis     High cholesterol     Hypertension     Vision decreased     Right eye with nerve problem that is \"improved\" has approx 60 % vision in right eye      ____________________________________________  PAST SURGICAL HISTORY  Past Surgical History:   Procedure Laterality Date    COLONOSCOPY N/A 2018    COLONOSCOPY performed by Flavio Aguayo MD at [facility] AMBULATORY OR    HX CATARACT REMOVAL Bilateral     HX COLONOSCOPY      HX OOPHORECTOMY        ____________________________________________  HOME MEDICATIONS    Current Outpatient Medications   Medication Sig    losartan (COZAAR) 100 mg tablet Take 1 Tab by mouth daily. Indications: hypertension    timolol (TIMOPTIC) 0.25 % ophthalmic solution Administer 1 Drop to both eyes two (2) times a day.  cholecalciferol, vitamin D3, (VITAMIN D3) 2,000 unit tab Take 1 Tab by mouth daily.     calcium-cholecalciferol, d3, (CALCIUM 600 + D) 600-125 mg-unit tab Take 1 Tab by mouth daily.  acetaminophen (TYLENOL) 325 mg tablet Take 650 mg by mouth every four (4) hours as needed for Pain. No current facility-administered medications for this encounter.       ____________________________________________  ALLERGIES  No Known Allergies   ____________________________________________  SOCIAL HISTORY  Social History     Tobacco Use    Smoking status: Never Smoker    Smokeless tobacco: Never Used   Substance Use Topics    Alcohol use: No      ____________________________________________        Labs:     Results for Brendan Posadas (MRN [id number]) as of 10/9/2019 08:35   Ref.  Range 10/7/2019 10:51 10/7/2019 11:08 10/7/2019 12:05   WBC Latest Ref Range: 3.6 - 11.0 K/uL 5.4     NRBC Latest Ref Range: 0  WBC 0.0     RBC Latest Ref Range: 3.80 - 5.20 M/uL 4.49     HGB Latest Ref Range: 11.5 - 16.0 g/dL 14.3     HCT Latest Ref Range: 35.0 - 47.0 % 44.3     MCV Latest Ref Range: 80.0 - 99.0 FL 98.7     MCH Latest Ref Range: 26.0 - 34.0 PG 31.8     MCHC Latest Ref Range: 30.0 - 36.5 g/dL 32.3     RDW Latest Ref Range: 11.5 - 14.5 % 12.4     PLATELET Latest Ref Range: 150 - 400 K/uL 210     MPV Latest Ref Range: 8.9 - 12.9 FL 9.4     ABSOLUTE NRBC Latest Ref Range: 0.00 - 0.01 K/uL 0.00     Color Latest Units:     YELLOW/STRAW   Appearance Latest Ref Range: CLEAR     CLEAR   Specific gravity Latest Ref Range: 1.003 - 1.030     1.005   pH (UA) Latest Ref Range: 5.0 - 8.0     7.0   Protein Latest Ref Range: NEG mg/dL   NEGATIVE   Glucose Latest Ref Range: NEG mg/dL   NEGATIVE   Ketone Latest Ref Range: NEG mg/dL   NEGATIVE   Blood Latest Ref Range: NEG     NEGATIVE   Bilirubin Latest Ref Range: NEG     NEGATIVE   Urobilinogen Latest Ref Range: 0.2 - 1.0 EU/dL   0.2   Nitrites Latest Ref Range: NEG     NEGATIVE   Leukocyte Esterase Latest Ref Range: NEG     NEGATIVE   Epithelial cells Latest Ref Range: FEW /lpf FEW   WBC Latest Ref Range: 0 - 4 /hpf   0-4   RBC Latest Ref Range: 0 - 5 /hpf   0-5   Bacteria Latest Ref Range: NEG /hpf   NEGATIVE   Hyaline cast Latest Ref Range: 0 - 5 /lpf   0-2   INR Latest Ref Range: 0.9 - 1.1   1.0     Prothrombin time Latest Ref Range: 9.0 - 11.1 sec 9.8     Sodium Latest Ref Range: 136 - 145 mmol/L 138     Potassium Latest Ref Range: 3.5 - 5.1 mmol/L 4.9     Chloride Latest Ref Range: 97 - 108 mmol/L 107     CO2 Latest Ref Range: 21 - 32 mmol/L 32     Anion gap Latest Ref Range: 5 - 15 mmol/L NEG 1     Glucose Latest Ref Range: 65 - 100 mg/dL 85     BUN Latest Ref Range: 6 - 20 MG/DL 13     Creatinine Latest Ref Range: 0.55 - 1.02 MG/DL 0.82     BUN/Creatinine ratio Latest Ref Range: 12 - 20   16     Calcium Latest Ref Range: 8.5 - 10.1 MG/DL 9.7     GFR est non-AA Latest Ref Range: >60 ml/min/1.73m2 >60     GFR est AA Latest Ref Range: >60 ml/min/1.73m2 >60     Bilirubin, total Latest Ref Range: 0.2 - 1.0 MG/DL 0.7     Protein, total Latest Ref Range: 6.4 - 8.2 g/dL 8.7 (H)     Albumin Latest Ref Range: 3.5 - 5.0 g/dL 4.2     Globulin Latest Ref Range: 2.0 - 4.0 g/dL 4.5 (H)     A-G Ratio Latest Ref Range: 1.1 - 2.2   0.9 (L)     ALT (SGPT) Latest Ref Range: 12 - 78 U/L 42     AST Latest Ref Range: 15 - 37 U/L 32     Alk. phosphatase Latest Ref Range: 45 - 117 U/L 78     Hemoglobin A1c, (calculated) Latest Ref Range: 4.2 - 6.3 % 5.7     Est. average glucose Latest Units: mg/dL 117     CULTURE, MRSA Unknown Rpt     Crossmatch Expiration Unknown 10/21/2019     TYPE & SCREEN Unknown Rpt     EKG, 12 LEAD, INITIAL Unknown  Rpt        Skin:       Denies open wounds, cuts, sores, rashes or other areas of concern in PAT assessment.        Sharda Stevens NP

## 2019-10-11 NOTE — PERIOP NOTES
10/11/19:  TCT: Dr. Gia Carrizales office, sp/w Harpreet Granger, request Surgery Clearance be faxed to PAT.

## 2019-10-21 ENCOUNTER — ANESTHESIA (OUTPATIENT)
Dept: SURGERY | Age: 74
DRG: 470 | End: 2019-10-21
Payer: MEDICARE

## 2019-10-21 ENCOUNTER — HOSPITAL ENCOUNTER (INPATIENT)
Age: 74
LOS: 2 days | Discharge: HOME HEALTH | DRG: 470 | End: 2019-10-23
Attending: ORTHOPAEDIC SURGERY | Admitting: ORTHOPAEDIC SURGERY
Payer: MEDICARE

## 2019-10-21 ENCOUNTER — ANESTHESIA EVENT (OUTPATIENT)
Dept: SURGERY | Age: 74
DRG: 470 | End: 2019-10-21
Payer: MEDICARE

## 2019-10-21 DIAGNOSIS — Z96.651 S/P TOTAL KNEE REPLACEMENT, RIGHT: Primary | ICD-10-CM

## 2019-10-21 PROBLEM — M17.11 OSTEOARTHROSIS, LOCALIZED, PRIMARY, KNEE, RIGHT: Status: ACTIVE | Noted: 2019-10-21

## 2019-10-21 PROCEDURE — 76010000162 HC OR TIME 1.5 TO 2 HR INTENSV-TIER 1: Performed by: ORTHOPAEDIC SURGERY

## 2019-10-21 PROCEDURE — 74011000258 HC RX REV CODE- 258: Performed by: ORTHOPAEDIC SURGERY

## 2019-10-21 PROCEDURE — 77030031139 HC SUT VCRL2 J&J -A: Performed by: ORTHOPAEDIC SURGERY

## 2019-10-21 PROCEDURE — C1776 JOINT DEVICE (IMPLANTABLE): HCPCS | Performed by: ORTHOPAEDIC SURGERY

## 2019-10-21 PROCEDURE — 77030010509 HC AIRWY LMA MSK TELE -A: Performed by: ANESTHESIOLOGY

## 2019-10-21 PROCEDURE — C1713 ANCHOR/SCREW BN/BN,TIS/BN: HCPCS | Performed by: ORTHOPAEDIC SURGERY

## 2019-10-21 PROCEDURE — 74011250636 HC RX REV CODE- 250/636: Performed by: ANESTHESIOLOGY

## 2019-10-21 PROCEDURE — 77030028907 HC WRP KNEE WO BGS SOLM -B

## 2019-10-21 PROCEDURE — 77030018846 HC SOL IRR STRL H20 ICUM -A: Performed by: ORTHOPAEDIC SURGERY

## 2019-10-21 PROCEDURE — 77030013708 HC HNDPC SUC IRR PULS STRY –B: Performed by: ORTHOPAEDIC SURGERY

## 2019-10-21 PROCEDURE — 77030039497 HC CST PAD STERILE CHCS -A: Performed by: ORTHOPAEDIC SURGERY

## 2019-10-21 PROCEDURE — 74011000250 HC RX REV CODE- 250: Performed by: NURSE ANESTHETIST, CERTIFIED REGISTERED

## 2019-10-21 PROCEDURE — 74011000272 HC RX REV CODE- 272: Performed by: ORTHOPAEDIC SURGERY

## 2019-10-21 PROCEDURE — 77030011640 HC PAD GRND REM COVD -A: Performed by: ORTHOPAEDIC SURGERY

## 2019-10-21 PROCEDURE — 77030010785: Performed by: ORTHOPAEDIC SURGERY

## 2019-10-21 PROCEDURE — 77030040361 HC SLV COMPR DVT MDII -B: Performed by: ORTHOPAEDIC SURGERY

## 2019-10-21 PROCEDURE — 74011250636 HC RX REV CODE- 250/636: Performed by: ORTHOPAEDIC SURGERY

## 2019-10-21 PROCEDURE — 74011250636 HC RX REV CODE- 250/636: Performed by: NURSE ANESTHETIST, CERTIFIED REGISTERED

## 2019-10-21 PROCEDURE — 0SRC0J9 REPLACEMENT OF RIGHT KNEE JOINT WITH SYNTHETIC SUBSTITUTE, CEMENTED, OPEN APPROACH: ICD-10-PCS | Performed by: ORTHOPAEDIC SURGERY

## 2019-10-21 PROCEDURE — 74011250637 HC RX REV CODE- 250/637: Performed by: ORTHOPAEDIC SURGERY

## 2019-10-21 PROCEDURE — 77030018836 HC SOL IRR NACL ICUM -A: Performed by: ORTHOPAEDIC SURGERY

## 2019-10-21 PROCEDURE — 76060000034 HC ANESTHESIA 1.5 TO 2 HR: Performed by: ORTHOPAEDIC SURGERY

## 2019-10-21 PROCEDURE — 74011000250 HC RX REV CODE- 250: Performed by: ORTHOPAEDIC SURGERY

## 2019-10-21 PROCEDURE — 77030006835 HC BLD SAW SAG STRY -B: Performed by: ORTHOPAEDIC SURGERY

## 2019-10-21 PROCEDURE — 97116 GAIT TRAINING THERAPY: CPT

## 2019-10-21 PROCEDURE — 77030012406 HC DRN WND PENRS BARD -A: Performed by: ORTHOPAEDIC SURGERY

## 2019-10-21 PROCEDURE — 77030008462 HC STPLR SKN PROX J&J -A: Performed by: ORTHOPAEDIC SURGERY

## 2019-10-21 PROCEDURE — 77030000032 HC CUF TRNQT ZIMM -B: Performed by: ORTHOPAEDIC SURGERY

## 2019-10-21 PROCEDURE — 76210000006 HC OR PH I REC 0.5 TO 1 HR: Performed by: ORTHOPAEDIC SURGERY

## 2019-10-21 PROCEDURE — 97162 PT EVAL MOD COMPLEX 30 MIN: CPT

## 2019-10-21 PROCEDURE — 77030018673: Performed by: ORTHOPAEDIC SURGERY

## 2019-10-21 PROCEDURE — 77030002912 HC SUT ETHBND J&J -A: Performed by: ORTHOPAEDIC SURGERY

## 2019-10-21 PROCEDURE — 65270000029 HC RM PRIVATE

## 2019-10-21 PROCEDURE — 74011250637 HC RX REV CODE- 250/637: Performed by: NURSE PRACTITIONER

## 2019-10-21 DEVICE — IMPLANTABLE DEVICE
Type: IMPLANTABLE DEVICE | Site: KNEE | Status: FUNCTIONAL
Brand: BIOMET KNEE SYSTEM

## 2019-10-21 DEVICE — IMPLANTABLE DEVICE
Type: IMPLANTABLE DEVICE | Site: KNEE | Status: FUNCTIONAL
Brand: VANGUARD® KNEE SYSTEM

## 2019-10-21 DEVICE — KNEE CEM FEM/TIB ARC/PAT -- VANGUARD K1: Type: IMPLANTABLE DEVICE | Site: KNEE | Status: FUNCTIONAL

## 2019-10-21 DEVICE — IMPLANTABLE DEVICE
Type: IMPLANTABLE DEVICE | Site: KNEE | Status: FUNCTIONAL
Brand: VANGUARD KNEE SYSTEM

## 2019-10-21 DEVICE — CEMENT BNE BIOMET HV R1X40GM --: Type: IMPLANTABLE DEVICE | Site: KNEE | Status: FUNCTIONAL

## 2019-10-21 RX ORDER — SODIUM CHLORIDE 9 MG/ML
125 INJECTION, SOLUTION INTRAVENOUS CONTINUOUS
Status: DISPENSED | OUTPATIENT
Start: 2019-10-21 | End: 2019-10-22

## 2019-10-21 RX ORDER — SODIUM CHLORIDE, SODIUM LACTATE, POTASSIUM CHLORIDE, CALCIUM CHLORIDE 600; 310; 30; 20 MG/100ML; MG/100ML; MG/100ML; MG/100ML
25 INJECTION, SOLUTION INTRAVENOUS CONTINUOUS
Status: DISCONTINUED | OUTPATIENT
Start: 2019-10-21 | End: 2019-10-21 | Stop reason: HOSPADM

## 2019-10-21 RX ORDER — HEPARIN SODIUM 1000 [USP'U]/ML
1000 INJECTION, SOLUTION INTRAVENOUS; SUBCUTANEOUS ONCE
Status: COMPLETED | OUTPATIENT
Start: 2019-10-21 | End: 2019-10-21

## 2019-10-21 RX ORDER — PREGABALIN 75 MG/1
75 CAPSULE ORAL ONCE
Status: COMPLETED | OUTPATIENT
Start: 2019-10-21 | End: 2019-10-21

## 2019-10-21 RX ORDER — SODIUM CHLORIDE 0.9 % (FLUSH) 0.9 %
5-40 SYRINGE (ML) INJECTION AS NEEDED
Status: DISCONTINUED | OUTPATIENT
Start: 2019-10-21 | End: 2019-10-21 | Stop reason: HOSPADM

## 2019-10-21 RX ORDER — SODIUM CHLORIDE 0.9 % (FLUSH) 0.9 %
5-40 SYRINGE (ML) INJECTION EVERY 8 HOURS
Status: DISCONTINUED | OUTPATIENT
Start: 2019-10-21 | End: 2019-10-21 | Stop reason: HOSPADM

## 2019-10-21 RX ORDER — ONDANSETRON 2 MG/ML
INJECTION INTRAMUSCULAR; INTRAVENOUS AS NEEDED
Status: DISCONTINUED | OUTPATIENT
Start: 2019-10-21 | End: 2019-10-21 | Stop reason: HOSPADM

## 2019-10-21 RX ORDER — TIMOLOL MALEATE 2.5 MG/ML
1 SOLUTION/ DROPS OPHTHALMIC 2 TIMES DAILY
Status: DISCONTINUED | OUTPATIENT
Start: 2019-10-21 | End: 2019-10-23 | Stop reason: HOSPADM

## 2019-10-21 RX ORDER — HYDRALAZINE HYDROCHLORIDE 20 MG/ML
10 INJECTION INTRAMUSCULAR; INTRAVENOUS
Status: DISCONTINUED | OUTPATIENT
Start: 2019-10-21 | End: 2019-10-23 | Stop reason: HOSPADM

## 2019-10-21 RX ORDER — FENTANYL CITRATE 50 UG/ML
25 INJECTION, SOLUTION INTRAMUSCULAR; INTRAVENOUS
Status: DISCONTINUED | OUTPATIENT
Start: 2019-10-21 | End: 2019-10-21 | Stop reason: HOSPADM

## 2019-10-21 RX ORDER — CELECOXIB 200 MG/1
200 CAPSULE ORAL ONCE
Status: COMPLETED | OUTPATIENT
Start: 2019-10-21 | End: 2019-10-21

## 2019-10-21 RX ORDER — LIDOCAINE HYDROCHLORIDE 10 MG/ML
0.1 INJECTION, SOLUTION EPIDURAL; INFILTRATION; INTRACAUDAL; PERINEURAL AS NEEDED
Status: DISCONTINUED | OUTPATIENT
Start: 2019-10-21 | End: 2019-10-21 | Stop reason: HOSPADM

## 2019-10-21 RX ORDER — TRANEXAMIC ACID 100 MG/ML
INJECTION, SOLUTION INTRAVENOUS
Status: DISPENSED
Start: 2019-10-21 | End: 2019-10-22

## 2019-10-21 RX ORDER — SCOLOPAMINE TRANSDERMAL SYSTEM 1 MG/1
1 PATCH, EXTENDED RELEASE TRANSDERMAL
Status: DISCONTINUED | OUTPATIENT
Start: 2019-10-21 | End: 2019-10-23 | Stop reason: HOSPADM

## 2019-10-21 RX ORDER — MORPHINE SULFATE 10 MG/ML
2 INJECTION, SOLUTION INTRAMUSCULAR; INTRAVENOUS
Status: DISCONTINUED | OUTPATIENT
Start: 2019-10-21 | End: 2019-10-21 | Stop reason: HOSPADM

## 2019-10-21 RX ORDER — FAMOTIDINE 20 MG/1
20 TABLET, FILM COATED ORAL 2 TIMES DAILY
Status: DISCONTINUED | OUTPATIENT
Start: 2019-10-21 | End: 2019-10-23 | Stop reason: HOSPADM

## 2019-10-21 RX ORDER — EPHEDRINE SULFATE/0.9% NACL/PF 50 MG/5 ML
SYRINGE (ML) INTRAVENOUS AS NEEDED
Status: DISCONTINUED | OUTPATIENT
Start: 2019-10-21 | End: 2019-10-21 | Stop reason: HOSPADM

## 2019-10-21 RX ORDER — ACETAMINOPHEN 500 MG
1000 TABLET ORAL ONCE
Status: COMPLETED | OUTPATIENT
Start: 2019-10-21 | End: 2019-10-21

## 2019-10-21 RX ORDER — CEFAZOLIN SODIUM/WATER 2 G/20 ML
2 SYRINGE (ML) INTRAVENOUS EVERY 8 HOURS
Status: COMPLETED | OUTPATIENT
Start: 2019-10-21 | End: 2019-10-22

## 2019-10-21 RX ORDER — ONDANSETRON 2 MG/ML
4 INJECTION INTRAMUSCULAR; INTRAVENOUS AS NEEDED
Status: DISCONTINUED | OUTPATIENT
Start: 2019-10-21 | End: 2019-10-21 | Stop reason: HOSPADM

## 2019-10-21 RX ORDER — ONDANSETRON 2 MG/ML
4 INJECTION INTRAMUSCULAR; INTRAVENOUS
Status: DISPENSED | OUTPATIENT
Start: 2019-10-21 | End: 2019-10-22

## 2019-10-21 RX ORDER — MORPHINE SULFATE 2 MG/ML
1 INJECTION, SOLUTION INTRAMUSCULAR; INTRAVENOUS
Status: ACTIVE | OUTPATIENT
Start: 2019-10-21 | End: 2019-10-22

## 2019-10-21 RX ORDER — CEFAZOLIN SODIUM/WATER 2 G/20 ML
2 SYRINGE (ML) INTRAVENOUS ONCE
Status: COMPLETED | OUTPATIENT
Start: 2019-10-21 | End: 2019-10-21

## 2019-10-21 RX ORDER — LIDOCAINE HYDROCHLORIDE 20 MG/ML
INJECTION, SOLUTION EPIDURAL; INFILTRATION; INTRACAUDAL; PERINEURAL AS NEEDED
Status: DISCONTINUED | OUTPATIENT
Start: 2019-10-21 | End: 2019-10-21 | Stop reason: HOSPADM

## 2019-10-21 RX ORDER — DEXAMETHASONE SODIUM PHOSPHATE 4 MG/ML
10 INJECTION, SOLUTION INTRA-ARTICULAR; INTRALESIONAL; INTRAMUSCULAR; INTRAVENOUS; SOFT TISSUE ONCE
Status: ACTIVE | OUTPATIENT
Start: 2019-10-22 | End: 2019-10-22

## 2019-10-21 RX ORDER — ADHESIVE BANDAGE
30 BANDAGE TOPICAL DAILY PRN
Status: DISCONTINUED | OUTPATIENT
Start: 2019-10-22 | End: 2019-10-23 | Stop reason: HOSPADM

## 2019-10-21 RX ORDER — FACIAL-BODY WIPES
10 EACH TOPICAL DAILY PRN
Status: DISCONTINUED | OUTPATIENT
Start: 2019-10-23 | End: 2019-10-23 | Stop reason: HOSPADM

## 2019-10-21 RX ORDER — SODIUM CHLORIDE 9 MG/ML
INJECTION, SOLUTION INTRAVENOUS
Status: DISPENSED
Start: 2019-10-21 | End: 2019-10-22

## 2019-10-21 RX ORDER — SODIUM CHLORIDE 0.9 % (FLUSH) 0.9 %
5-40 SYRINGE (ML) INJECTION EVERY 8 HOURS
Status: DISCONTINUED | OUTPATIENT
Start: 2019-10-21 | End: 2019-10-23 | Stop reason: HOSPADM

## 2019-10-21 RX ORDER — NALOXONE HYDROCHLORIDE 0.4 MG/ML
0.4 INJECTION, SOLUTION INTRAMUSCULAR; INTRAVENOUS; SUBCUTANEOUS AS NEEDED
Status: DISCONTINUED | OUTPATIENT
Start: 2019-10-21 | End: 2019-10-23 | Stop reason: HOSPADM

## 2019-10-21 RX ORDER — AMOXICILLIN 250 MG
1 CAPSULE ORAL 2 TIMES DAILY
Status: DISCONTINUED | OUTPATIENT
Start: 2019-10-21 | End: 2019-10-23 | Stop reason: HOSPADM

## 2019-10-21 RX ORDER — ASPIRIN 325 MG
325 TABLET ORAL 2 TIMES DAILY
Status: DISCONTINUED | OUTPATIENT
Start: 2019-10-22 | End: 2019-10-23 | Stop reason: HOSPADM

## 2019-10-21 RX ORDER — DIPHENHYDRAMINE HCL 12.5MG/5ML
12.5 LIQUID (ML) ORAL
Status: DISCONTINUED | OUTPATIENT
Start: 2019-10-21 | End: 2019-10-23 | Stop reason: HOSPADM

## 2019-10-21 RX ORDER — SODIUM CHLORIDE 0.9 % (FLUSH) 0.9 %
5-40 SYRINGE (ML) INJECTION AS NEEDED
Status: DISCONTINUED | OUTPATIENT
Start: 2019-10-21 | End: 2019-10-23 | Stop reason: HOSPADM

## 2019-10-21 RX ORDER — OXYCODONE HYDROCHLORIDE 5 MG/1
5 TABLET ORAL
Status: DISCONTINUED | OUTPATIENT
Start: 2019-10-21 | End: 2019-10-23 | Stop reason: HOSPADM

## 2019-10-21 RX ORDER — PROPOFOL 10 MG/ML
INJECTION, EMULSION INTRAVENOUS AS NEEDED
Status: DISCONTINUED | OUTPATIENT
Start: 2019-10-21 | End: 2019-10-21 | Stop reason: HOSPADM

## 2019-10-21 RX ORDER — POLYETHYLENE GLYCOL 3350 17 G/17G
17 POWDER, FOR SOLUTION ORAL DAILY
Status: DISCONTINUED | OUTPATIENT
Start: 2019-10-22 | End: 2019-10-23 | Stop reason: HOSPADM

## 2019-10-21 RX ORDER — KETOROLAC TROMETHAMINE 30 MG/ML
15 INJECTION, SOLUTION INTRAMUSCULAR; INTRAVENOUS EVERY 6 HOURS
Status: COMPLETED | OUTPATIENT
Start: 2019-10-21 | End: 2019-10-22

## 2019-10-21 RX ORDER — ACETAMINOPHEN 325 MG/1
650 TABLET ORAL EVERY 6 HOURS
Status: DISCONTINUED | OUTPATIENT
Start: 2019-10-21 | End: 2019-10-23 | Stop reason: HOSPADM

## 2019-10-21 RX ORDER — DIPHENHYDRAMINE HYDROCHLORIDE 50 MG/ML
12.5 INJECTION, SOLUTION INTRAMUSCULAR; INTRAVENOUS
Status: DISCONTINUED | OUTPATIENT
Start: 2019-10-21 | End: 2019-10-21 | Stop reason: HOSPADM

## 2019-10-21 RX ORDER — FENTANYL CITRATE 50 UG/ML
INJECTION, SOLUTION INTRAMUSCULAR; INTRAVENOUS AS NEEDED
Status: DISCONTINUED | OUTPATIENT
Start: 2019-10-21 | End: 2019-10-21 | Stop reason: HOSPADM

## 2019-10-21 RX ORDER — ONDANSETRON 4 MG/1
4 TABLET, ORALLY DISINTEGRATING ORAL
Status: DISCONTINUED | OUTPATIENT
Start: 2019-10-23 | End: 2019-10-23 | Stop reason: HOSPADM

## 2019-10-21 RX ADMIN — Medication 2 G: at 21:21

## 2019-10-21 RX ADMIN — SODIUM CHLORIDE 125 ML/HR: 900 INJECTION, SOLUTION INTRAVENOUS at 20:36

## 2019-10-21 RX ADMIN — Medication 2 G: at 12:27

## 2019-10-21 RX ADMIN — CELECOXIB 200 MG: 200 CAPSULE ORAL at 11:13

## 2019-10-21 RX ADMIN — Medication 10 MG: at 12:51

## 2019-10-21 RX ADMIN — PROPOFOL 120 MG: 10 INJECTION, EMULSION INTRAVENOUS at 12:33

## 2019-10-21 RX ADMIN — PREGABALIN 75 MG: 75 CAPSULE ORAL at 11:13

## 2019-10-21 RX ADMIN — FENTANYL CITRATE 25 MCG: 50 INJECTION, SOLUTION INTRAMUSCULAR; INTRAVENOUS at 12:39

## 2019-10-21 RX ADMIN — SODIUM CHLORIDE, SODIUM LACTATE, POTASSIUM CHLORIDE, AND CALCIUM CHLORIDE 25 ML/HR: 600; 310; 30; 20 INJECTION, SOLUTION INTRAVENOUS at 11:12

## 2019-10-21 RX ADMIN — OXYCODONE HYDROCHLORIDE 5 MG: 5 TABLET ORAL at 20:31

## 2019-10-21 RX ADMIN — TRANEXAMIC ACID 1 G: 100 INJECTION, SOLUTION INTRAVENOUS at 12:40

## 2019-10-21 RX ADMIN — FENTANYL CITRATE 25 MCG: 50 INJECTION INTRAMUSCULAR; INTRAVENOUS at 14:45

## 2019-10-21 RX ADMIN — KETOROLAC TROMETHAMINE 15 MG: 30 INJECTION, SOLUTION INTRAMUSCULAR at 20:36

## 2019-10-21 RX ADMIN — SODIUM CHLORIDE 1000 MG: 900 INJECTION INTRAVENOUS at 14:36

## 2019-10-21 RX ADMIN — HEPARIN SODIUM 1000 UNITS: 1000 INJECTION, SOLUTION INTRAVENOUS; SUBCUTANEOUS at 12:40

## 2019-10-21 RX ADMIN — LIDOCAINE HYDROCHLORIDE 80 MG: 20 INJECTION, SOLUTION EPIDURAL; INFILTRATION; INTRACAUDAL; PERINEURAL at 12:33

## 2019-10-21 RX ADMIN — FENTANYL CITRATE 25 MCG: 50 INJECTION, SOLUTION INTRAMUSCULAR; INTRAVENOUS at 13:11

## 2019-10-21 RX ADMIN — SODIUM CHLORIDE 125 ML/HR: 900 INJECTION, SOLUTION INTRAVENOUS at 16:46

## 2019-10-21 RX ADMIN — SODIUM CHLORIDE, SODIUM LACTATE, POTASSIUM CHLORIDE, AND CALCIUM CHLORIDE: 600; 310; 30; 20 INJECTION, SOLUTION INTRAVENOUS at 13:44

## 2019-10-21 RX ADMIN — FENTANYL CITRATE 25 MCG: 50 INJECTION INTRAMUSCULAR; INTRAVENOUS at 14:36

## 2019-10-21 RX ADMIN — ACETAMINOPHEN 650 MG: 325 TABLET ORAL at 17:47

## 2019-10-21 RX ADMIN — ONDANSETRON 4 MG: 2 INJECTION INTRAMUSCULAR; INTRAVENOUS at 16:04

## 2019-10-21 RX ADMIN — Medication 10 MG: at 12:40

## 2019-10-21 RX ADMIN — ONDANSETRON HYDROCHLORIDE 4 MG: 2 INJECTION, SOLUTION INTRAMUSCULAR; INTRAVENOUS at 12:27

## 2019-10-21 RX ADMIN — FAMOTIDINE 20 MG: 20 TABLET ORAL at 17:47

## 2019-10-21 RX ADMIN — ACETAMINOPHEN 1000 MG: 500 TABLET ORAL at 11:13

## 2019-10-21 RX ADMIN — SENNOSIDES AND DOCUSATE SODIUM 1 TABLET: 8.6; 5 TABLET ORAL at 17:47

## 2019-10-21 NOTE — PROGRESS NOTES
Problem: Mobility Impaired (Adult and Pediatric)  Goal: *Acute Goals and Plan of Care (Insert Text)  Description  FUNCTIONAL STATUS PRIOR TO ADMISSION: Patient was independent and active without use of DME.    HOME SUPPORT PRIOR TO ADMISSION: The patient lived with  but did not require assist.    Physical Therapy Goals  Initiated 10/21/2019    1. Patient will move from supine to sit and sit to supine  in bed with independence within 4 days. 2. Patient will perform sit to stand with independence within 4 days. 3. Patient will ambulate with modified independence for 300 feet with the least restrictive device within 4 days. 4. Patient will ascend/descend 4 stairs with 1 handrail(s) with independence within 4 days. 5. Patient will perform home exercise program per protocol with independence within 4 days. 6. Patient will demonstrate AROM 0-90 degrees in operative joint within 4 days. Outcome: Progressing Towards Goal  PHYSICAL THERAPY EVALUATION  Patient: Abbie Harrington (84 y.o. female)  Date: 10/21/2019  Primary Diagnosis: Osteoarthrosis, localized, primary, knee, right [M17.11]  Procedure(s) (LRB):  RIGHT TOTAL KNEE REPLACEMENT (surg pain sol) (Right) Day of Surgery   Precautions:   WBAT, Fall      ASSESSMENT  Based on the objective data described below, the patient presents with increased nausea, dizziness, generalized weakness, decreased activity tolerance, and overall decreased functional mobility. Pt was received in supine on 3L and cleared by nursing to mobilize. Pt nauseous an dizzy during the entire session. Pt HTN but also increased due to emesis. She was able to come to the EOB, daughter present and very attentive. She was able to stand but then became sick again and was returned to sitting and supine. Expect pt to do very well with therapy once she is feeling better. Ice and compression applied at the end of the session.     Vitals:    10/21/19 1554 10/21/19 1558 10/21/19 1600 10/21/19 1623 BP: 161/84 (!) 184/94 (!) 171/101 188/85   BP 1 Location: Right arm Right arm Right arm Right arm   BP Patient Position: Pre-activity;Supine Pre-activity; Sitting Sitting Post activity;Supine   Pulse: 71 73 86 70   Resp:   16    Temp:       SpO2: 100%      Weight:       Height:            Current Level of Function Impacting Discharge (mobility/balance): CGA    Functional Outcome Measure: The patient scored 55/100 on the barthel outcome measure which is indicative of 45% impaipred. Other factors to consider for discharge: nausea/emesis     Patient will benefit from skilled therapy intervention to address the above noted impairments. PLAN :  Recommendations and Planned Interventions: bed mobility training, transfer training, gait training, therapeutic exercises, patient and family training/education and therapeutic activities      Frequency/Duration: Patient will be followed by physical therapy:  twice daily to address goals.     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\" has approx 60 % vision in right eye     Past Surgical History:   Procedure Laterality Date    COLONOSCOPY N/A 8/22/2018    COLONOSCOPY performed by Fran Phoenix, MD at [facility] AMBULATORY OR    HX CATARACT REMOVAL Bilateral     HX COLONOSCOPY      HX OOPHORECTOMY  1998       Personal factors and/or comorbidities impacting plan of care: nauseous     Home Situation  Home Environment: Private residence  # Steps to Enter: 4  Rails to Enter: Yes  Hand Rails : Bilateral(wide)  One/Two Story Residence: One story  Living Alone: No  Support Systems: Spouse/Significant Other/Partner, Family member(s)  Patient Expects to be Discharged to[de-identified] Private residence  Current DME Used/Available at Home: Nic Lev, straight, Walker, rolling    EXAMINATION/PRESENTATION/DECISION MAKING:   Critical Behavior:  Neurologic State: Drowsy  Orientation Level: Oriented to person  Cognition: Follows commands     Hearing:     Skin:  ace wrap in place  Edema: none  Range Of Motion:  AROM: Generally decreased, functional           PROM: Generally decreased, functional           Strength:    Strength: Generally decreased, functional                    Tone & Sensation:   Tone: Normal              Sensation: Intact               Coordination:  Coordination: Within functional limits  Vision:      Functional Mobility:  Bed Mobility:  Rolling: Contact guard assistance  Supine to Sit: Contact guard assistance  Sit to Supine: Contact guard assistance  Scooting: Contact guard assistance  Transfers:  Sit to Stand: Contact guard assistance  Stand to Sit: Contact guard assistance                       Balance:   Sitting: Intact  Standing: Intact; With support  Ambulation/Gait Training:            Side stepped 1 step with RW and CGA               Functional Measure:  Barthel Index:    Bathin  Bladder: 10  Bowels: 10  Groomin  Dressin  Feeding: 10  Mobility: 0  Stairs: 0  Toilet Use: 5  Transfer (Bed to Chair and Back): 10  Total: 55/100       The Barthel ADL Index: Guidelines  1. The index should be used as a record of what a patient does, not as a record of what a patient could do. 2. The main aim is to establish degree of independence from any help, physical or verbal, however minor and for whatever reason. 3. The need for supervision renders the patient not independent.   4. A patient's performance should be established using the best available evidence. Asking the patient, friends/relatives and nurses are the usual sources, but direct observation and common sense are also important. However direct testing is not needed. 5. Usually the patient's performance over the preceding 24-48 hours is important, but occasionally longer periods will be relevant. 6. Middle categories imply that the patient supplies over 50 per cent of the effort. 7. Use of aids to be independent is allowed. Marcelo Ordonez., Barthel, DHaileeW. (1443). Functional evaluation: the Barthel Index. [facility] (14)2. Ewa Her michael LOIS Pérez, Tiffanie Cox., Merlin Judd., [place], [address] (1999). Measuring the change indisability after inpatient rehabilitation; comparison of the responsiveness of the Barthel Index and Functional [place] Measure. Journal of Neurology, Neurosurgery, and Psychiatry, 66(4), 500-993. Uvaldo North, NHaileeJ.A, XENIA De Luna, & Mario Galdamez, MHaileeA. (2004.) Assessment of post-stroke quality of life in cost-effectiveness studies: The usefulness of the Barthel Index and the EuroQoL-5D. Quality of Life Research, 15, 334-97            Physical Therapy Evaluation Charge Determination   History Examination Presentation Decision-Making   LOW Complexity : Zero comorbidities / personal factors that will impact the outcome / POC MEDIUM Complexity : 3 Standardized tests and measures addressing body structure, function, activity limitation and / or participation in recreation  MEDIUM Complexity : Evolving with changing characteristics  Other outcome measures barthel  MEDIUM      Based on the above components, the patient evaluation is determined to be of the following complexity level: MEDIUM    Pain Rating:  Pt did not state any pain    Activity Tolerance:   Fair  Please refer to the flowsheet for vital signs taken during this treatment.     After treatment patient left in no apparent distress:   Supine in bed and Call bell within reach    COMMUNICATION/EDUCATION:   The patients plan of care was discussed with: Registered Nurse. Fall prevention education was provided and the patient/caregiver indicated understanding. and Patient/family agree to work toward stated goals and plan of care.     Thank you for this referral.  Shannon Frausto, PT, DPT   Time Calculation: 33 mins

## 2019-10-21 NOTE — ANESTHESIA POSTPROCEDURE EVALUATION
Procedure(s):  RIGHT TOTAL KNEE REPLACEMENT (surg pain sol). general, total IV anesthesia    Anesthesia Post Evaluation        Patient location during evaluation: PACU  Note status: Adequate. Level of consciousness: responsive to verbal stimuli and sleepy but conscious  Pain management: satisfactory to patient  Airway patency: patent  Anesthetic complications: no  Cardiovascular status: acceptable  Respiratory status: acceptable  Hydration status: acceptable  Comments: +Post-Anesthesia Evaluation and Assessment    Patient: Jarad Mahoney MRN: [id number]  SSN: [id number]   YOB: 1945  Age: 66 y.o. Sex: female      Cardiovascular Function/Vital Signs    /64 (BP 1 Location: Right arm, BP Patient Position: At rest)   Pulse 60   Temp 36.4 °C (97.5 °F)   Resp 15   Ht 4' 7.75\" (1.416 m)   Wt 51.1 kg (112 lb 10.5 oz)   SpO2 100%   BMI 25.48 kg/m²     Patient is status post Procedure(s):  RIGHT TOTAL KNEE REPLACEMENT (surg pain sol). Nausea/Vomiting: Controlled. Postoperative hydration reviewed and adequate. Pain:  Pain Scale 1: FACES (10/21/19 1445)  Pain Intensity 1: 4 (10/21/19 1445)   Managed. Neurological Status:   Neuro (WDL): Exceptions to WDL (10/21/19 1416)   At baseline. Mental Status and Level of Consciousness: Arousable. Pulmonary Status:   O2 Device: Nasal cannula (10/21/19 1445)   Adequate oxygenation and airway patent. Complications related to anesthesia: None    Post-anesthesia assessment completed. No concerns. Signed By: Jo Ann Oconnor MD    10/21/2019  Post anesthesia nausea and vomiting:  controlled      Vitals Value Taken Time   /64 10/21/2019  2:45 PM   Temp 36.4 °C (97.5 °F) 10/21/2019  2:16 PM   Pulse 68 10/21/2019  2:57 PM   Resp 15 10/21/2019  2:57 PM   SpO2 99 % 10/21/2019  2:57 PM   Vitals shown include unvalidated device data.

## 2019-10-21 NOTE — PROGRESS NOTES
Ortho/ NeuroSurgery NP Note    POD# 0  s/p RIGHT TOTAL KNEE REPLACEMENT (surg pain sol)     Pt resting in bed, with daughter at bedside. Patient speaks very little [place], daughter translating. Patient complains of PONV and dizziness. VSS, with mild hypertension  Afebrile. Patient has not had something to eat/drink. Most Recent Labs:   Lab Results   Component Value Date/Time    HGB 14.3 10/07/2019 10:51 AM    Hemoglobin A1c 5.7 10/07/2019 10:51 AM       Body mass index is 25.48 kg/m². Reference: BMI greater than 30 is classified as obesity and greater than 40 is classified as morbid obesity. STOP BANG Score: 2    Voiding status: needs to void  Dressing c.d.i, with ace wrap. Cryotherapy in place  HV drain to suction     Calves soft and supple;  No pain with passive stretch  Sensation and motor intact  SCDs for mechanical DVT proph    Plan:  1) PT BID starting today  2) Tika-op Antibiotics Ancef  3) Aspirin 325 mg PO BID for DVT Prophylaxis  4) Pepcid for GI Prophylaxis    5) PONV: prn antiemetics ordered, scop patch ordered   6) Hx of hypertension: Home losartan on hold, PRN hydralazine ordered   7) Discharge plans to home with family pending progression with therapy, ability to void and PONV resolution     Readiness for discharge:     [] Vital Signs stable - hypertension as above    [x] Hgb stable    [] + Voiding - has not voided, monitor for POUR and follow protocol    [x] Incision intact, drainage minimal    [] Tolerating PO intake  - PONV as above    [] Cleared by PT (OT if applicable)     [] Stair training completed (if applicable)    [] Independent/Contact Guard ambulation with assistive device (household distance)     [] Bed mobility     [] Car transfers     [] ADLs    [x] Adequate pain control on oral medication alone      Kenneth Figueroa NP

## 2019-10-21 NOTE — BRIEF OP NOTE
BRIEF OPERATIVE NOTE    Date of Procedure: 10/21/2019   Preoperative Diagnosis: RIGHT KNEE OSTEOARTHRITIS  Postoperative Diagnosis: RIGHT KNEE OSTEOARTHRITIS    Procedure(s):  RIGHT TOTAL KNEE REPLACEMENT (surg pain sol)  Surgeon(s) and Role:     Loly Kam MD - Primary         Surgical Assistant: 0    Surgical Staff:  Circ-1: Checo Arora RN  Circ-Relief: Allison Mera RN  Scrub Tech-1: Alexiakiko Archibald  Surg Asst-1: Humberto Patel  Event Time In Time Out   Incision Start 1251    Incision Close 1359      Anesthesia: General   Estimated Blood Loss: 100  Specimens: * No specimens in log *   Findings: 0   Complications: 0  Implants:   Implant Name Type Inv. Item Serial No.  Lot No. LRB No. Used Action   CEMENT BNE BIOMET HV [id number] --  - SNA  CEMENT BNE BIOMET HV [id number] --  NA [employer] [id number] Right 1 Implanted   TRAY TIB FIN 63MM COCR -- MAXIM - SNA  TRAY TIB FIN 63MM COCR -- MAXIM NA [employer] [id number] Right 1 Implanted   PAT SER A STD 31X8 3 PEG -- NO WIRE - SNA  PAT SER A STD 31X8 3 PEG -- NO WIRE NA [employer] [id number] Right 1 Implanted   FEM RET CRUC INTLOK RT 57. 5MM -- VANGUARD - SNA  FEM RET CRUC INTLOK RT 57. 5MM -- VANGUARD NA [employer] [id number] Right 1 Implanted   INSERT TIB BEAR ANT STBL 14X63 -- VANGUARD - SNA  INSERT TIB BEAR ANT STBL 14X63 -- VANGUARD NA [employer] [id number] Right 1 Implanted

## 2019-10-21 NOTE — DISCHARGE INSTRUCTIONS
[address] [employer]  Surgery: Total Knee Replacement  Surgeon:   Marquise Heck MD  Surgery Date:  10/21/2019     To relieve pain:   Use ice/gel packs.  Put the ice pack directly over the wound, or anywhere you are hurting or swollen.  To control pain and swelling, keep ice on regularly, especially after physical activity.  The packs should stay cold for 3-4 hours. When it is not cold anymore, rotate with the packs in the freezer.  Elevate your leg. This will also keep swelling down.  Rest for at least 20 minutes between activity or exercises.  To keep track of your pain medications, write down what you take and when you take it.  The last dose of pain medication you got in the hospital was:       Medication    Dose    Date & Time         Choose your medications based on the pain scale below:     To keep your pain under control, take Tylenol every 6 hours for 14 days - even if you feel like you dont need it.  For mild to moderate pain (4-6 on pain scale), take one pain pill every 3-4 hours as needed.  For severe pain (7-10 on pain scale), take two pain pills every 3-4 hours as needed.  To prevent nausea, take your pain medications with food. Pain Scale                 As your pain lessens:     Slowly start taking less pain medication. You may do this by waiting longer between doses or by taking smaller doses.  Stop using the pain medications as soon as you no longer need it, usually in 2-3 weeks.  Aspirin   To prevent blood clots, you will need to take Aspirin 325 mg twice a day for 30 days.  To prevent stomach upset or bleeding:   Do not take non-steroidal anti-inflammatory medications (Ibuprofen, Advil, Motrin, Naproxen, etc.)    Take Pepcid 20 mg twice a day, or a similar home medication, while you are taking a blood thinner.             OPSITE (Honeycomb dressing)     Keep your clear, waterproof dressing in place for 5-7 days after your leave the hospital.     If you are still having drainage, you will need to change your dressing in 5-7 days. You will be given one extra dressing to use at home.  If there is no more drainage from the wound, you may leave it open to the air. OPSITE DRESSING INSTRUCTIONS                                                DO NOTs:   Do not rub your surgical wound   Do not put lotion or oils on your wound.  Do not take a tub bath or go swimming until your doctor says it is ok.  You may shower with this dressing over your wound.  After showering pat the dressing dry.  If you have staples a home health nurse will remove them in about 10 days.  To increase and promote healing:   Stop Smoking (or at least cut back on       Smoking).  Eat a well-balanced diet (high in protein       and vitamin C).  If you have a poor appetite, drink Ensure, Glucerna, or         [place] Instant Breakfast for the next 30 days.  If you are diabetic, you should control your blood         sugars to prevent infection and help your wound         to heal.                         To prevent constipation, stay active and drink plenty of fluid.  While using pain medications, you should also take stool softeners and laxatives, such as Pericolace       and Miralax.  If you are having too many bowel       Movements, then you may need       to stop taking the laxatives.  You should have a bowel movement 3-4 days        after surgery and then at least every other day while       on pain medication.  To improve your recovery, you must be active!  Use your walker and take short walks (in your home)         about every 2 hours during the day.  Try to increase how far you walk each day.  You can put as much weight on your leg as you can tolerate while walking.           To avoid getting a stiff knee, work on getting your knee bent and straight as soon as possible.  Home health physical therapy will come to your       home the day after you leave the hospital.  The       therapist will visit about 4 times over the next        couple of weeks to teach you how to get out of        bed, to safely walk in your home, and to do your        exercises.  NO DRIVING until your surgeon tells you it is ok.  You can return to work when cleared by a physician.  Please call your physician immediately if you have:     Constant bleeding from your wound.  Increasing redness or swelling around your wound (Some warmth, bruising and swelling is normal).  Change in wound drainage (increase in amount, color, or bad smell).  Change in mental status (unusual behavior   Temperature over 101.5 degrees Fahrenheit      Pain or redness in the calf (back of your lower leg - see picture)     Increased swelling of the thigh, ankle, calf, or foot.  Emergency: CALL 911 if you have:     Shortness of breath     Chest pain when you cough or taking a deep breath     Please call your surgeons office at [phone]  for a follow up appointment. _   You should call as soon as you get home or the next day after discharge. Ask to make an appointment in 2 weeks.  If you have questions or concerns during normal business hours, you may reach Dr. Davida Ortiz' Team at [phone].

## 2019-10-21 NOTE — PERIOP NOTES
TRANSFER - OUT REPORT:    Verbal report given to BISHOP Mayes(name) on Anitha   being transferred to ortho(unit) for routine post - op       Report consisted of patients Situation, Background, Assessment and   Recommendations(SBAR). Information from the following report(s) SBAR, Kardex, OR Summary and MAR was reviewed with the receiving nurse. Lines:   Peripheral IV 10/21/19 Left;Posterior Hand (Active)   Site Assessment Clean, dry, & intact 10/21/2019  2:16 PM   Phlebitis Assessment 0 10/21/2019  2:16 PM   Infiltration Assessment 0 10/21/2019  2:16 PM   Dressing Status Clean, dry, & intact 10/21/2019  2:16 PM   Dressing Type Transparent 10/21/2019  2:16 PM   Hub Color/Line Status Pink; Infusing 10/21/2019  2:16 PM        Opportunity for questions and clarification was provided.       Patient transported with:   O2 @ 2 liters  Tech

## 2019-10-21 NOTE — PERIOP NOTES
Handoff Report from Operating Room to PACU    Report received from NATASHA Villegas and Raghav Bustillo regarding Yasemin Chowdhury. Surgeon(s):  Charmayne Burr, MD  And Procedure(s) (LRB):  RIGHT TOTAL KNEE REPLACEMENT (surg pain sol) (Right)  confirmed   with allergies and dressings discussed. Anesthesia type, drugs, patient history, complications, estimated blood loss, vital signs, intake and output, and lines and temperature were reviewed.

## 2019-10-21 NOTE — ANESTHESIA PREPROCEDURE EVALUATION
Anesthetic History   No history of anesthetic complications            Review of Systems / Medical History  Patient summary reviewed, nursing notes reviewed and pertinent labs reviewed    Pulmonary  Within defined limits                 Neuro/Psych   Within defined limits           Cardiovascular    Hypertension: well controlled          Hyperlipidemia    Exercise tolerance: <4 METS     GI/Hepatic/Renal  Within defined limits              Endo/Other        Arthritis     Other Findings   Comments: Decreased vision right eye  [place]; speaks little [place]           Physical Exam    Airway  Mallampati: II  TM Distance: 4 - 6 cm    Mouth opening: Normal     Cardiovascular    Rhythm: regular  Rate: normal      Pertinent negatives: No murmur   Dental    Dentition: Full upper dentures and Full lower dentures     Pulmonary  Breath sounds clear to auscultation               Abdominal  GI exam deferred       Other Findings            Anesthetic Plan    ASA: 2  Anesthesia type: general and total IV anesthesia    Monitoring Plan: BIS      Induction: Intravenous  Anesthetic plan and risks discussed with: Patient and Son / Daughter

## 2019-10-21 NOTE — PERIOP NOTES
1100 blue [employer] phone utilized for Greetz # [id number]. reviewed medical history ,surgery history ,anesthesia history, allergies, etc consent  . , then pt gave permission  For daughter to translate for her. (tv hairston.  ) pt has on  Green colored bracelet which cannot be removed taped and paperwork filled out.

## 2019-10-22 LAB
ANION GAP SERPL CALC-SCNC: 5 MMOL/L (ref 5–15)
BUN SERPL-MCNC: 12 MG/DL (ref 6–20)
BUN/CREAT SERPL: 18 (ref 12–20)
CALCIUM SERPL-MCNC: 7.9 MG/DL (ref 8.5–10.1)
CHLORIDE SERPL-SCNC: 110 MMOL/L (ref 97–108)
CO2 SERPL-SCNC: 26 MMOL/L (ref 21–32)
CREAT SERPL-MCNC: 0.67 MG/DL (ref 0.55–1.02)
GLUCOSE SERPL-MCNC: 90 MG/DL (ref 65–100)
HGB BLD-MCNC: 9.9 G/DL (ref 11.5–16)
POTASSIUM SERPL-SCNC: 4.1 MMOL/L (ref 3.5–5.1)
SODIUM SERPL-SCNC: 141 MMOL/L (ref 136–145)

## 2019-10-22 PROCEDURE — 97530 THERAPEUTIC ACTIVITIES: CPT

## 2019-10-22 PROCEDURE — 74011250636 HC RX REV CODE- 250/636: Performed by: ORTHOPAEDIC SURGERY

## 2019-10-22 PROCEDURE — 97110 THERAPEUTIC EXERCISES: CPT

## 2019-10-22 PROCEDURE — 65270000029 HC RM PRIVATE

## 2019-10-22 PROCEDURE — 94760 N-INVAS EAR/PLS OXIMETRY 1: CPT

## 2019-10-22 PROCEDURE — 74011000250 HC RX REV CODE- 250: Performed by: ORTHOPAEDIC SURGERY

## 2019-10-22 PROCEDURE — 36415 COLL VENOUS BLD VENIPUNCTURE: CPT

## 2019-10-22 PROCEDURE — 85018 HEMOGLOBIN: CPT

## 2019-10-22 PROCEDURE — 97116 GAIT TRAINING THERAPY: CPT

## 2019-10-22 PROCEDURE — 74011250637 HC RX REV CODE- 250/637: Performed by: ORTHOPAEDIC SURGERY

## 2019-10-22 PROCEDURE — 80048 BASIC METABOLIC PNL TOTAL CA: CPT

## 2019-10-22 RX ORDER — AMOXICILLIN 250 MG
1 CAPSULE ORAL DAILY
Qty: 30 TAB | Refills: 0 | Status: SHIPPED | OUTPATIENT
Start: 2019-10-22

## 2019-10-22 RX ORDER — OXYCODONE HYDROCHLORIDE 5 MG/1
5 TABLET ORAL
Qty: 60 TAB | Refills: 0 | Status: SHIPPED | OUTPATIENT
Start: 2019-10-22 | End: 2019-11-05

## 2019-10-22 RX ORDER — FAMOTIDINE 20 MG/1
20 TABLET, FILM COATED ORAL 2 TIMES DAILY
Qty: 60 TAB | Refills: 0 | Status: SHIPPED | OUTPATIENT
Start: 2019-10-22 | End: 2019-11-21

## 2019-10-22 RX ORDER — POLYETHYLENE GLYCOL 3350 17 G/17G
17 POWDER, FOR SOLUTION ORAL
Qty: 15 PACKET | Refills: 0 | Status: SHIPPED | OUTPATIENT
Start: 2019-10-22 | End: 2019-11-06

## 2019-10-22 RX ORDER — ASPIRIN 325 MG
325 TABLET ORAL 2 TIMES DAILY
Qty: 60 TAB | Refills: 0 | Status: SHIPPED | OUTPATIENT
Start: 2019-10-22 | End: 2019-11-21

## 2019-10-22 RX ORDER — ACETAMINOPHEN 325 MG/1
650 TABLET ORAL EVERY 6 HOURS
Qty: 112 TAB | Refills: 0 | Status: SHIPPED
Start: 2019-10-22 | End: 2019-11-05

## 2019-10-22 RX ADMIN — KETOROLAC TROMETHAMINE 15 MG: 30 INJECTION, SOLUTION INTRAMUSCULAR at 00:21

## 2019-10-22 RX ADMIN — ASPIRIN 325 MG: 325 TABLET, FILM COATED ORAL at 17:28

## 2019-10-22 RX ADMIN — SENNOSIDES AND DOCUSATE SODIUM 1 TABLET: 8.6; 5 TABLET ORAL at 08:25

## 2019-10-22 RX ADMIN — TIMOLOL MALEATE 1 DROP: 2.5 SOLUTION OPHTHALMIC at 09:00

## 2019-10-22 RX ADMIN — ACETAMINOPHEN 650 MG: 325 TABLET ORAL at 23:25

## 2019-10-22 RX ADMIN — KETOROLAC TROMETHAMINE 15 MG: 30 INJECTION, SOLUTION INTRAMUSCULAR at 05:19

## 2019-10-22 RX ADMIN — OXYCODONE HYDROCHLORIDE 5 MG: 5 TABLET ORAL at 08:25

## 2019-10-22 RX ADMIN — ACETAMINOPHEN 650 MG: 325 TABLET ORAL at 12:24

## 2019-10-22 RX ADMIN — OXYCODONE HYDROCHLORIDE 5 MG: 5 TABLET ORAL at 12:27

## 2019-10-22 RX ADMIN — Medication 10 ML: at 05:19

## 2019-10-22 RX ADMIN — ACETAMINOPHEN 650 MG: 325 TABLET ORAL at 05:19

## 2019-10-22 RX ADMIN — SENNOSIDES AND DOCUSATE SODIUM 1 TABLET: 8.6; 5 TABLET ORAL at 17:28

## 2019-10-22 RX ADMIN — ACETAMINOPHEN 650 MG: 325 TABLET ORAL at 17:28

## 2019-10-22 RX ADMIN — ASPIRIN 325 MG: 325 TABLET, FILM COATED ORAL at 08:25

## 2019-10-22 RX ADMIN — POLYETHYLENE GLYCOL (3350) 17 G: 17 POWDER, FOR SOLUTION ORAL at 08:24

## 2019-10-22 RX ADMIN — Medication 10 ML: at 23:26

## 2019-10-22 RX ADMIN — Medication 10 ML: at 17:28

## 2019-10-22 RX ADMIN — OXYCODONE HYDROCHLORIDE 5 MG: 5 TABLET ORAL at 21:52

## 2019-10-22 RX ADMIN — FAMOTIDINE 20 MG: 20 TABLET ORAL at 08:25

## 2019-10-22 RX ADMIN — Medication 2 G: at 05:19

## 2019-10-22 RX ADMIN — KETOROLAC TROMETHAMINE 15 MG: 30 INJECTION, SOLUTION INTRAMUSCULAR at 12:00

## 2019-10-22 RX ADMIN — FAMOTIDINE 20 MG: 20 TABLET ORAL at 17:28

## 2019-10-22 NOTE — OP NOTES
[address]  OPERATIVE REPORT    Name:  Agatha Sandifer  MR#:  [id number]  :  1945  ACCOUNT #:  [de-identified]  DATE OF SERVICE:  10/21/2019    PREOPERATIVE DIAGNOSIS:  Degenerative joint disease of right knee. POSTOPERATIVE DIAGNOSIS:  Degenerative joint disease of right knee. PROCEDURE PERFORMED:  Right total knee replacement. SURGEON:  Chaim Fu MD    ASSISTANT:  None. ANESTHESIA:  General.    COMPLICATIONS:  None. SPECIMENS REMOVED:  None. IMPLANTS:  Yes. ESTIMATED BLOOD LOSS:  100 mL. CLOSURE:  Staples. PROCEDURE:  The patient was given smooth induction of general anesthesia in the supine position on operating table. The right lower extremity was prepped and draped with a thigh tourniquet. An anterior midline incision was made, then a medial parapatellar retinacular incision. The patella was everted and resected to restore 20 mm of thickness. Three drill holes were made for the 31 patellar button. The knee was then flexed and a drill hole made in the distal femur for the IM guide graeme, 9 mm of distal femur were resected at 4 degrees of valgus. Remaining cuts were made to accommodate a 57.5 Biomet Vanguard cruciate retaining femoral prosthesis in 6 degrees of external rotation. The proximal tibia was resected perpendicular and sized at 63. The tibial stem preparation was completed in the appropriate rotation. The bone surfaces were copiously irrigated with antibiotic solution and pulsatile lavage. The three components were cemented simultaneously with Kenzie cement. Trial tibial bearings revealed that a 14 anterior stabilize would be ideal.  It was placed followed by the locking clip. The knee had excellent range of motion, stability and patellar tracking after a lateral release was performed. The wound was copiously irrigated. Tourniquet deflated and hemostasis obtained. The soft tissues were injected with Marcaine solution.   The fascia was closed with #1 Ethibond and Vicryl over a medium Hemovac drain. Subcutaneous tissues were closed with 2-0 Vicryl and skin with staples. Sterile dressings were applied. The patient was taken to Recovery in stable extubated condition with a correct count, good pulse to her foot.         Irineo Zamora MD      [id number]  D:  10/21/2019 14:14  T:  10/21/2019 22:20  JOB #:  [id number]  CC:

## 2019-10-22 NOTE — DISCHARGE SUMMARY
Ortho Discharge Summary    Patient ID:  Andrew العراقي  [id number]  female  66 y.o.  1945    Admit date: 10/21/2019    Discharge date: 10/23/2019    Admitting Physician: Brice Aviles MD     Consulting Physician(s):   Treatment Team: Attending Provider: Cheyenne Calvert MD; Nurse Practitioner: Paz Scott NP; Utilization Review: Florina Chaudhary RN; Care Manager: Luba Carrizales Primary Nurse: Cyndy Villegas    Date of Surgery:   10/21/2019     Preoperative Diagnosis:  RIGHT KNEE OSTEOARTHRITIS    Postoperative Diagnosis:   RIGHT KNEE OSTEOARTHRITIS    Procedure(s):     RIGHT TOTAL KNEE REPLACEMENT (surg pain sol)     Anesthesia Type:   General     Surgeon: Cheyenne Calvert MD                            HPI:  Pt is a 66 y.o. female who has a history of RIGHT KNEE OSTEOARTHRITIS  with pain and limitations of activities of daily living who presents at this time for a right TKA following the failure of conservative management. PMH:   Past Medical History:   Diagnosis Date    Arthritis     High cholesterol     Hypertension     Vision decreased     Right eye with nerve problem that is \"improved\" has approx 60 % vision in right eye       Body mass index is 25.48 kg/m². : A BMI > 30 is classified as obesity and > 40 is classified as morbid obesity. Medications upon admission :   Prior to Admission Medications   Prescriptions Last Dose Informant Patient Reported? Taking?   acetaminophen (TYLENOL) 325 mg tablet Not Taking at Unknown time  Yes No   Sig: Take 650 mg by mouth every four (4) hours as needed for Pain. calcium-cholecalciferol, d3, (CALCIUM 600 + D) 600-125 mg-unit tab 10/14/2019  Yes No   Sig: Take 1 Tab by mouth daily. cholecalciferol, vitamin D3, (VITAMIN D3) 2,000 unit tab 10/14/2019  Yes No   Sig: Take 1 Tab by mouth daily. losartan (COZAAR) 100 mg tablet 10/21/2019 at 0900  Yes Yes   Sig: Take 1 Tab by mouth daily.  Indications: hypertension   timolol (TIMOPTIC) 0.25 % ophthalmic solution 10/21/2019 at 0900  Yes Yes   Sig: Administer 1 Drop to both eyes two (2) times a day. Facility-Administered Medications: None        Allergies:  No Known Allergies     Hospital Course: The patient underwent surgery. Complications:  None; patient tolerated the procedure well. Was taken to the PACU in stable condition and then transferred to the ortho floor. On POD 1 patient with drainage from surgical incision, dry dressing and ace wrap applied overnight. On POD 2, no new drainage noted     Perioperative Antibiotics:  Ancef     Postoperative Pain Management:  Oxycodone      DVT Prophylaxis: Aspirin 325    Postoperative transfusions:    Number of units banked PRBCs =   none     Post Op complications: none    Hemoglobin at discharge:    Lab Results   Component Value Date/Time    HGB 8.8 (L) 10/23/2019 03:49 AM    INR 1.0 10/07/2019 10:51 AM       Dressing was changed on POD # 1. Incision - clean, dry and intact. No significant erythema or swelling. Neurovascular exam found to be within normal limits. Wound appears to be healing without any evidence of infection. Pt had a HVAC drain that was removed on POD# 1. Physical Therapy started following surgery and participated in bed mobility, transfers and ambulation. Gait:  Gait  Base of Support: Narrowed  Speed/Luz: Pace decreased (<100 feet/min)  Step Length: Right shortened, Left shortened  Gait Abnormalities: Decreased step clearance  Ambulation - Level of Assistance: Contact guard assistance, Supervision  Distance (ft): 380 Feet (ft)  Assistive Device: Gait belt, Walker, rolling  Rail Use: Both  Stairs - Level of Assistance: Contact guard assistance  Number of Stairs Trained: 4                   Discharged to: Home.     Condition on Discharge:   stable    Discharge instructions:  - Anticoagulate with Aspirin 325 BID  - Take pain medications as prescribed  - Resume pre hospital diet      - Discharge activity: activity as tolerated  - Ambulate with assistive device as needed. - Weight bearing status WBAT  - Wound Care Keep wound clean and dry. See discharge instruction sheet. -DISCHARGE MEDICATION LIST     Current Discharge Medication List      START taking these medications    Details   aspirin (ASPIRIN) 325 mg tablet Take 1 Tab by mouth two (2) times a day for 30 days. Qty: 60 Tab, Refills: 0      famotidine (PEPCID) 20 mg tablet Take 1 Tab by mouth two (2) times a day for 30 days. Qty: 60 Tab, Refills: 0      oxyCODONE IR (ROXICODONE) 5 mg immediate release tablet Take 1 Tab by mouth every four (4) hours as needed for Pain for up to 14 days. Max Daily Amount: 30 mg.  Qty: 60 Tab, Refills: 0    Associated Diagnoses: S/P total knee replacement, right      polyethylene glycol (MIRALAX) 17 gram packet Take 1 Packet by mouth daily as needed (constipation) for up to 15 days. Qty: 15 Packet, Refills: 0      senna-docusate (PERICOLACE) 8.6-50 mg per tablet Take 1 Tab by mouth daily. Qty: 30 Tab, Refills: 0         CONTINUE these medications which have CHANGED    Details   acetaminophen (TYLENOL) 325 mg tablet Take 2 Tabs by mouth every six (6) hours for 14 days. Qty: 112 Tab, Refills: 0         CONTINUE these medications which have NOT CHANGED    Details   losartan (COZAAR) 100 mg tablet Take 1 Tab by mouth daily. Indications: hypertension  Refills: 3      timolol (TIMOPTIC) 0.25 % ophthalmic solution Administer 1 Drop to both eyes two (2) times a day. Refills: 3      cholecalciferol, vitamin D3, (VITAMIN D3) 2,000 unit tab Take 1 Tab by mouth daily. calcium-cholecalciferol, d3, (CALCIUM 600 + D) 600-125 mg-unit tab Take 1 Tab by mouth daily.           per medical continuation form      -Follow up in office in 2 weeks      Signed:  Flakito Zapata DNP, [id number] [address] Nurse Practitioner    10/23/2019  10:57 AM

## 2019-10-22 NOTE — PROGRESS NOTES
Patient ambulates with 1 person assist to [facility]. C/o pain to right knee. Pain medication given throughout shift. Hemovac removed in AM. Dressing to right knee is CDI.

## 2019-10-22 NOTE — PHYSICIAN ADVISORY
Short Stay Review       Pt Name:  Jarad Mahoney   MR#  [id number]   [facility]#   [id number]   [address]  [phone]  @ [facility]   Hospitalization date  10/21/2019 10:24 AM  No discharge date for patient encounter. Current Attending Physician  Carita Boeck, MD     A discharge order has been placed for this episode of hospital care for Ms. Jarad Mahoney; since this hospital stay is less than two midnights, I reviewed Ms. [facility] Tanya's chart. Ms. MALCOM HINKLE [facility] Tanya's healthcare insurance/benefit include:  Payor: [place] MEDICARE / Plan: VA MEDICARE PART A & B / Product Type: Medicare /     Utilization Review related case summary:   Age  66 y.o.   BMI  Body mass index is 25.48 kg/m². Functional status ASA Class  2   PMHx includes  HTN, Hyperlipidemia    EKG     Echo     Hospital course  Uncomplicated RIGHT TKA    PT OT evaluation     Other Social/logistical issues     Risk of deterioration at the time this patient was hospitalized     Moderate            On the basis of chart review, this patient's hospitalization status         Should be changed to OBSERVATION     The information in this document is a recommendation to be used for utilization review and utilization management purposes only. This recommendation is not an order. The recommendation is made based on the information reviewed at the time of the referral, is pursuant to the [place] [facility] Conditions of Participation (42 CFR Part 482), and is neither a judgment nor an assessment with regard to the appropriateness or quality of clinical care. For all Managed Care patients: The Criteria are intended solely for use as screening guidelines with respect to the medical appropriateness of healthcare services and not for final clinical or payment determinations concerning the type or level of medical care provided, or proposed to be provided, to a patient.  They help the reviewers determine whether a patient is appropriate for observation or inpatient admission at the time a decision to admit the patient is being made. All efforts are made to apply the pertinent payor criteria ([employer] or [employer]) as well as the clinical judgements based on the information reviewed at the time of the referral.\" Nothing in this document may be used to limit, alter, or affect clinical services provided to the patient named below.       Wilford Meckel MD MPH FACP   Cell: [phone]  Physician [address] [place] [address]   Utilization Review, Care Management         CSN:  [id number]   REGGIE:   [id number]  Admitted on :  10/21/2019   Discharge order

## 2019-10-22 NOTE — PROGRESS NOTES
Orthopedic End of Shift Note    Bedside and Verbal shift change report given to Solitario KAUR RN (oncoming nurse) by Lizzy Friday (offgoing nurse). Report included the following information SBAR, Kardex, Intake/Output, MAR and Recent Results. POD#  1    Significant issues during shift: Patient ambulates with 1 person assist to [facility]. C/o pain to right knee. Pain medication given throughout shift. Hemovac removed in AM. Dressing to right knee is CDI. Issues for Physician to address: Patient ambulates with 1 person assist to [facility]. C/o pain to right knee. Pain medication given throughout shift. Hemovac removed in AM. Dressing to right knee is CDI.      Activity This Shift  (check all that apply) [] chair  [] dangle   [] bathroom  [x] bedside commode [] hallway  [] bedrest   Nausea/Vomiting [] yes [x] no     Voiding Status [x] void [] Vazquez [] I&O Cath   Bowel Movements [] yes [] no     Foot Pumps or SCD [x] yes [] no    Ice Pack [x] yes    [] no    Incentive Spirometer [] yes [] no Volume:      Telemetry Monitoring   [] yes [x] no Rhythm:   Supplemental O2 [] yes [x] no Sat off O2:   98%

## 2019-10-22 NOTE — PROGRESS NOTES
Ortho / Neurosurgery NP Note    POD# 1  s/p RIGHT TOTAL KNEE REPLACEMENT (surg pain sol)     Pt resting in bed. Moving L leg independently in bed with no apparent pain. VSS Afebrile. Voiding status: spontaneous void    Output (mL)  Urine Voided: 300 ml (10/22/19 0310)  Last Bowel Movement Date: 10/21/19 (10/22/19 0020)  Unmeasurable Output  Urine Occurrence(s): 1(voided in bathroom   ) (10/21/19 1045)      Labs  Lab Results   Component Value Date/Time    HGB 9.9 (L) 10/22/2019 05:16 AM      Lab Results   Component Value Date/Time    INR 1.0 10/07/2019 10:51 AM        Recent Glucose Results:   Lab Results   Component Value Date/Time    GLU 90 10/22/2019 05:16 AM         Body mass index is 25.48 kg/m². : A BMI > 30 is classified as obesity and > 40 is classified as morbid obesity. Alert and oriented, limited conversation as patient's primary language is not ni  R knee opsite dressing c.d.i. However lateral drain site with serosanguinous drainage noted to gauze. No apparent active bleeding. Cryotherapy in place over incision  Calves soft and supple; No pain with passive stretch  Sensation and motor intact  SCDs for mechanical DVT proph while in bed     PLAN:  1) PT BID WBAT  2) Aspirin 325 mg PO BID for DVT Prophylaxis   3) GI Prophylaxis - Pepcid  4) PONV -- PRN antiemetics IV zofran ordered, last received 10/21 at 1600. Scopolamine patch in place. Eating and drinking okay per patient.     5) Readniess for discharge:     [x] Vital Signs stable    [x] Hgb stable -- 9.9 this AM (    [x] + Voiding    [x] Wound intact, drainage minimal    [x] Tolerating PO intake     [] Cleared by PT (OT if applicable) -- for eval this AM     [] Stair training completed (if applicable)    [] Independent / Contact Guard Assist (household distance)     [] Bed mobility     [] Car transfers     [] ADLs    [x] Adequate pain control on oral medication alone     Pending PT progression today, possible DC to home today with [facility]. Erasmo Blood BSN RN FREYA   MSN/AGACNP Student

## 2019-10-22 NOTE — PROGRESS NOTES
Problem: Mobility Impaired (Adult and Pediatric)  Goal: *Acute Goals and Plan of Care (Insert Text)  Description  FUNCTIONAL STATUS PRIOR TO ADMISSION: Patient was independent and active without use of DME.    HOME SUPPORT PRIOR TO ADMISSION: The patient lived with  but did not require assist.    Physical Therapy Goals  Initiated 10/21/2019    1. Patient will move from supine to sit and sit to supine  in bed with independence within 4 days. 2. Patient will perform sit to stand with independence within 4 days. 3. Patient will ambulate with modified independence for 300 feet with the least restrictive device within 4 days. 4. Patient will ascend/descend 4 stairs with 1 handrail(s) with independence within 4 days. 5. Patient will perform home exercise program per protocol with independence within 4 days. 6. Patient will demonstrate AROM 0-90 degrees in operative joint within 4 days. Outcome: Resolved/Met   PHYSICAL THERAPY TREATMENT  Patient: Danita Veras (34 y.o. female)  Date: 10/22/2019  Diagnosis: Osteoarthrosis, localized, primary, knee, right [M17.11] S/P total knee replacement, right  Procedure(s) (LRB):  RIGHT TOTAL KNEE REPLACEMENT (surg pain sol) (Right) 1 Day Post-Op  Precautions: WBAT, Fall  Chart, physical therapy assessment, plan of care and goals were reviewed. ASSESSMENT  Based on the objective data described below, Pt presents with decreased strength and increased pain with mobility. Pt performed supine to sit at [facility] with additional time. Pt performed sit to stand transfer at [facility] with cueing for hand placement. Pt ambulated 380ft with RW at [facility]. Pt moving well but with increased pain with time. Pt ascended and descended 4 steps with both ralings at [facility] with cueing for sequencing. Pt performed a car transfer at [place] [address] with visual cueing for sequencing. Pt preformed seated exercises once back in room. Pt moving well with no safety concerns.  From physical therapy stand point pt cleared for discharge. Current Level of Function Impacting Discharge (mobility/balance): Pt able to mobilize at [facility] for all activity. Pt able to ambulated with RW at [facility]. Other factors to consider for discharge: has supportive family         PLAN :  Patient continues to benefit from skilled intervention to address the above impairments. Continue treatment per established plan of care. to address goals. Recommendation for discharge: (in order for the patient to meet his/her long term goals)  Physical therapy at least 2 days/week in the home     This discharge recommendation:  Has been made in collaboration with the attending provider and/or case management    IF patient discharges home will need the following DME: patient owns DME required for discharge       SUBJECTIVE:   Patient stated I have to pee pee.     OBJECTIVE DATA SUMMARY:   Critical Behavior:  Neurologic State: Alert, Appropriate for age  Orientation Level: Oriented X4  Cognition: Follows commands       Range of Motion:         AROM: Generally decreased, Functional                        Functional Mobility Training:  Bed Mobility:  Rolling: Contact guard assistance; Additional time  Supine to Sit: Contact guard assistance; Additional time     Scooting: Contact guard assistance        Transfers:  Sit to Stand: Contact guard assistance  Stand to Sit: Contact guard assistance                             Balance:  Sitting: Intact  Standing: Intact; With support  Ambulation/Gait Training:  Distance (ft): 380 Feet (ft)  Assistive Device: Gait belt;Walker, rolling  Ambulation - Level of Assistance: Contact guard assistance;Supervision        Gait Abnormalities: Decreased step clearance        Base of Support: Narrowed     Speed/Luz: Pace decreased (<100 feet/min)  Step Length: Right shortened;Left shortened         Stairs:  Number of Stairs Trained: 4  Stairs - Level of Assistance: Contact guard assistance   Rail Use: Both    Therapeutic Exercises:     EXERCISE   Sets   Reps   Active Active Assist   Passive Self ROM   Comments   Ankle Pumps 1 10 ?                                        ?                                        ?                                        ?                                           Quad Sets   ? ?                                        ?                                        ?                                           Hamstring Sets   ? ?                                        ?                                        ?                                           Short Arc Quads   ? ?                                        ?                                        ?                                           Knee Extension Stretch     ? ?                                          ?                                          ?                                           Heel Slides   ? ?                                        ?                                        ?                                           Long Arc Quads 1 10 ?                                        ?                                        ?                                        ?                                           Knee Flexion Stretch 1 5 ?                                        ?                                        ?                                        ?                                           Straight Leg Raises   ? ?                                        ?                                        ?                                               Pain Rating:  Pt with increased pain with mobility.      Activity Tolerance:   Good and requires rest breaks due to pain buy moving well   Please refer to the flowsheet for vital signs taken during this treatment.     After treatment patient left in no apparent distress:   Sitting in chair and Call bell within reach    COMMUNICATION/COLLABORATION:   The patients plan of care was discussed with: Registered Nurse    Mor Stewart PTA   Time Calculation: 39 mins

## 2019-10-23 VITALS
SYSTOLIC BLOOD PRESSURE: 154 MMHG | HEIGHT: 56 IN | RESPIRATION RATE: 14 BRPM | OXYGEN SATURATION: 96 % | HEART RATE: 66 BPM | WEIGHT: 112.66 LBS | DIASTOLIC BLOOD PRESSURE: 77 MMHG | BODY MASS INDEX: 25.34 KG/M2 | TEMPERATURE: 98 F

## 2019-10-23 LAB
ANION GAP SERPL CALC-SCNC: 8 MMOL/L (ref 5–15)
BUN SERPL-MCNC: 18 MG/DL (ref 6–20)
BUN/CREAT SERPL: 26 (ref 12–20)
CALCIUM SERPL-MCNC: 8 MG/DL (ref 8.5–10.1)
CHLORIDE SERPL-SCNC: 112 MMOL/L (ref 97–108)
CO2 SERPL-SCNC: 22 MMOL/L (ref 21–32)
CREAT SERPL-MCNC: 0.7 MG/DL (ref 0.55–1.02)
GLUCOSE SERPL-MCNC: 96 MG/DL (ref 65–100)
HGB BLD-MCNC: 8.8 G/DL (ref 11.5–16)
POTASSIUM SERPL-SCNC: 3.9 MMOL/L (ref 3.5–5.1)
SODIUM SERPL-SCNC: 142 MMOL/L (ref 136–145)

## 2019-10-23 PROCEDURE — 74011250637 HC RX REV CODE- 250/637: Performed by: NURSE PRACTITIONER

## 2019-10-23 PROCEDURE — 74011250637 HC RX REV CODE- 250/637: Performed by: ORTHOPAEDIC SURGERY

## 2019-10-23 PROCEDURE — 80048 BASIC METABOLIC PNL TOTAL CA: CPT

## 2019-10-23 PROCEDURE — 74011250636 HC RX REV CODE- 250/636: Performed by: NURSE PRACTITIONER

## 2019-10-23 PROCEDURE — 36415 COLL VENOUS BLD VENIPUNCTURE: CPT

## 2019-10-23 PROCEDURE — 85018 HEMOGLOBIN: CPT

## 2019-10-23 RX ORDER — LOSARTAN POTASSIUM 100 MG/1
100 TABLET ORAL DAILY
Status: DISCONTINUED | OUTPATIENT
Start: 2019-10-23 | End: 2019-10-23 | Stop reason: HOSPADM

## 2019-10-23 RX ADMIN — ACETAMINOPHEN 650 MG: 325 TABLET ORAL at 12:10

## 2019-10-23 RX ADMIN — ACETAMINOPHEN 650 MG: 325 TABLET ORAL at 07:05

## 2019-10-23 RX ADMIN — TIMOLOL MALEATE 1 DROP: 2.5 SOLUTION OPHTHALMIC at 09:00

## 2019-10-23 RX ADMIN — FAMOTIDINE 20 MG: 20 TABLET ORAL at 09:02

## 2019-10-23 RX ADMIN — Medication 10 ML: at 07:05

## 2019-10-23 RX ADMIN — SENNOSIDES AND DOCUSATE SODIUM 1 TABLET: 8.6; 5 TABLET ORAL at 09:02

## 2019-10-23 RX ADMIN — LOSARTAN POTASSIUM 100 MG: 100 TABLET ORAL at 09:02

## 2019-10-23 RX ADMIN — ASPIRIN 325 MG: 325 TABLET, FILM COATED ORAL at 09:02

## 2019-10-23 RX ADMIN — HYDRALAZINE HYDROCHLORIDE 10 MG: 20 INJECTION INTRAMUSCULAR; INTRAVENOUS at 10:06

## 2019-10-23 NOTE — PROGRESS NOTES
Ortho / Neurosurgery NP Note    POD# 2  s/p RIGHT TOTAL KNEE REPLACEMENT (surg pain sol)     Pt resting in bed. Moving L leg independently in bed with no apparent pain. Of note, patient did not discharge home yesterday related to drainage that saturated 4 dressings yesterday, dry, bandage with ace wrap applied overnight. No new drainage noted this am.      VSS Afebrile. Voiding status: spontaneous void        Labs  Lab Results   Component Value Date/Time    HGB 8.8 (L) 10/23/2019 03:49 AM      Lab Results   Component Value Date/Time    INR 1.0 10/07/2019 10:51 AM        Recent Glucose Results:   Lab Results   Component Value Date/Time    GLU 96 10/23/2019 03:49 AM         Body mass index is 25.48 kg/m². : A BMI > 30 is classified as obesity and > 40 is classified as morbid obesity. Alert and oriented, limited conversation as patient's primary language is not ni  R knee dressing dry and intact-   Cryotherapy in place over incision  Calves soft and supple; No pain with passive stretch  Sensation and motor intact  SCDs for mechanical DVT proph while in bed     PLAN:  1) PT BID WBAT  2) Aspirin 325 mg PO BID for DVT Prophylaxis   3) GI Prophylaxis - Pepcid  4) PONV -- PRN antiemetics IV zofran ordered, last received 10/21 at 1600. Scopolamine patch in place. Eating and drinking okay per patient.     5) Readniess for discharge:     [x] Vital Signs stable    [x] Hgb stable -- 9.9 this AM (    [x] + Voiding    [x] Wound intact, drainage minimal    [x] Tolerating PO intake     [x] Cleared by PT (OT if applicable) -- for eval this AM     [x] Stair training completed (if applicable)    [x] Independent / Contact Guard Assist (household distance)     [x] Bed mobility     [x] Car transfers     [x] ADLs    [x] Adequate pain control on oral medication alone     Plan for discharge home with daughter today     Aryan kAins NP

## 2019-10-23 NOTE — PROGRESS NOTES
Reviewed discharge instructions, prescriptions, and side effects with patient and her family. Reviewed wound care, follow-up instructions, and medication instructions. Answered all questions and provided contact information for future questions. Took IV out per policy, Catheter tip intact. Provided Post-op Hygiene kit. Going home in a car with home health.

## 2019-10-23 NOTE — PROGRESS NOTES
ZECHARIAH: home with home health ([facility]) and family to transport patient home     [facility] accepted patient for [facility] services. AVS updated. Patient cleared for d/c from CM standpoint.      Vick Steven, [address]

## 2021-01-19 ENCOUNTER — TRANSCRIBE ORDER (OUTPATIENT)
Dept: SCHEDULING | Age: 76
End: 2021-01-19

## 2021-01-19 DIAGNOSIS — M54.31 SCIATICA OF RIGHT SIDE: Primary | ICD-10-CM

## 2021-01-19 DIAGNOSIS — M43.16 SPONDYLOLISTHESIS OF LUMBAR REGION: ICD-10-CM

## 2021-01-28 ENCOUNTER — HOSPITAL ENCOUNTER (OUTPATIENT)
Dept: MRI IMAGING | Age: 76
Discharge: HOME | End: 2021-01-28
Attending: ORTHOPAEDIC SURGERY
Payer: MEDICARE

## 2021-01-28 DIAGNOSIS — M54.31 SCIATICA OF RIGHT SIDE: ICD-10-CM

## 2021-01-28 DIAGNOSIS — M43.16 SPONDYLOLISTHESIS OF LUMBAR REGION: ICD-10-CM

## 2021-01-28 PROCEDURE — 72148 MRI LUMBAR SPINE W/O DYE: CPT

## 2022-05-08 NOTE — PROGRESS NOTES
ZECHARIAH: home with home health and daughter to transport home  1) pending home health choice when daughter arrives to hospital    Reason for Admission:  Right total knee replacement                     RRAT Score:  7                   Plan for utilizing home health: pending daughter decision                          Current Advanced Directive/Advance Care Plan: not at this time                          Transition of Care Plan:                      Patient is a 67 y/o female who was admitted to [facility] for a planned right total knee replacement. CM made room visit with patient who was alert and oriented with no visitors present in room. There was a bit of a language barrier as patient's primary language is [place] however patient was able to complete a vast majority of assessment with CM. Patient confirmed demographics, insurance, and PCP (last seen 2 wk ago). Patient uses [facility] pharmacy in [facility], unsure of road. Patient and  reside in a single level home with 3 entry steps. Patient has DME including RW, shower chair, and cane. Prior to admission patient was independent with ALDs (some assistance from ) and IADLs (painful and used cane). Patient denied any history of HH, SNF, or IPR. CM unable to get [facility] choice from patient due to language barrier and MD made room visit during this attempted conversation. CM notified by therapy that daughter will be coming to hospital today and normally translates for patient. CM will continue to follow. Update 11:37am    CM made room visit with patient again to discuss home health again. Patient's dtr requested for CM to talk to her daughter. Patient provided daughter's number [phone]. CM contacted daughter while in patient's room. Patient's daughter requested to look up [facility] agencies and get back to CM. CM provided phone number to call CM back. Per daughter she will be at hospital within the next 30 min to an hour.  CM requested a phone call with [facility] choices prior to that so CM can send referral. Daughter understood and will contact CM back as soon as she looks up reviews of [facility] agencies. Update 1:53pm    CM contacted patient's daughter back as she had not contacted CM with [facility] choice as requested. Patient's daughter stated that she would like to use [facility]. FOC signed and on bedside chart. Referral sent via [email or web address] and patient was accepted by [facility] for [facility] services. AVS updated. Patient cleared for d/c from CM standpoint. Care Management Interventions  PCP Verified by CM: Yes  Mode of Transport at Discharge:  Other (see comment)  Transition of Care Consult (CM Consult): Discharge Planning, Home Health  Discharge Durable Medical Equipment: No  Physical Therapy Consult: Yes  Occupational Therapy Consult: No  Speech Therapy Consult: No  Current Support Network: Lives with Spouse, Own Home, Family Lives Nearby  Confirm Follow Up Transport: Family  Plan discussed with Pt/Family/Caregiver: Yes  Discharge Location  Discharge Placement: Home with home health      Vick Little, [address] pending SLP evaluation

## 2022-09-26 ENCOUNTER — TRANSCRIBE ORDER (OUTPATIENT)
Dept: SCHEDULING | Age: 77
End: 2022-09-26

## 2022-09-26 DIAGNOSIS — R06.09 DOE (DYSPNEA ON EXERTION): ICD-10-CM

## 2022-09-26 DIAGNOSIS — R01.1 MURMUR: Primary | ICD-10-CM

## 2022-09-26 DIAGNOSIS — R94.31 ABNORMAL EKG: ICD-10-CM

## 2022-09-30 ENCOUNTER — HOSPITAL ENCOUNTER (OUTPATIENT)
Dept: NON INVASIVE DIAGNOSTICS | Age: 77
Discharge: HOME | End: 2022-09-30
Attending: GENERAL PRACTICE
Payer: MEDICARE

## 2022-09-30 DIAGNOSIS — R01.1 MURMUR: ICD-10-CM

## 2022-09-30 DIAGNOSIS — R94.31 ABNORMAL EKG: ICD-10-CM

## 2022-09-30 DIAGNOSIS — R06.09 DOE (DYSPNEA ON EXERTION): ICD-10-CM

## 2022-09-30 LAB
ECHO AO ROOT DIAM: 2.5 CM
ECHO AR MAX VEL PISA: 2.9 M/S
ECHO AV AREA PLAN: 1.6 CM2
ECHO AV REGURGITANT PHT: 566.5 MILLISECOND
ECHO EST RA PRESSURE: 5 MMHG
ECHO LA DIAMETER: 2.9 CM
ECHO LA TO AORTIC ROOT RATIO: 1.16
ECHO LA VOL 4C: 24 ML (ref 22–52)
ECHO LV EDV A4C: 47 ML
ECHO LV EJECTION FRACTION A4C: 76 %
ECHO LV ESV A4C: 11 ML
ECHO LV FRACTIONAL SHORTENING: 38 % (ref 28–44)
ECHO LV INTERNAL DIMENSION DIASTOLIC: 3.2 CM (ref 3.9–5.3)
ECHO LV INTERNAL DIMENSION SYSTOLIC: 2 CM
ECHO LV IVSD: 1.4 CM (ref 0.6–0.9)
ECHO LV MASS 2D: 127.4 G (ref 67–162)
ECHO LV POSTERIOR WALL DIASTOLIC: 1.1 CM (ref 0.6–0.9)
ECHO LV RELATIVE WALL THICKNESS RATIO: 0.69
ECHO LVOT AREA: 2.8 CM2
ECHO LVOT DIAM: 1.9 CM
ECHO LVOT PEAK GRADIENT: 5 MMHG
ECHO LVOT PEAK VELOCITY: 1.1 M/S
ECHO MV A VELOCITY: 0.58 M/S
ECHO MV AREA PHT: 5.5 CM2
ECHO MV AREA PLAN: 3.7 CM2
ECHO MV E DECELERATION TIME (DT): 114.1 MS
ECHO MV E VELOCITY: 0.33 M/S
ECHO MV E/A RATIO: 0.57
ECHO MV MAX VELOCITY: 1.1 M/S
ECHO MV MEAN GRADIENT: 1 MMHG
ECHO MV MEAN VELOCITY: 0.5 M/S
ECHO MV PEAK GRADIENT: 4 MMHG
ECHO MV PRESSURE HALF TIME (PHT): 40.3 MS
ECHO MV VTI: 18.9 CM
ECHO PV MAX VELOCITY: 1.1 M/S
ECHO PV PEAK GRADIENT: 5 MMHG
ECHO RIGHT VENTRICULAR SYSTOLIC PRESSURE (RVSP): 37 MMHG
ECHO TV REGURGITANT MAX VELOCITY: 2.83 M/S
ECHO TV REGURGITANT PEAK GRADIENT: 33 MMHG

## 2022-09-30 PROCEDURE — 93306 TTE W/DOPPLER COMPLETE: CPT | Performed by: INTERNAL MEDICINE

## 2022-09-30 PROCEDURE — 93306 TTE W/DOPPLER COMPLETE: CPT

## (undated) DEVICE — 3M™ TEGADERM™ TRANSPARENT FILM DRESSING FRAME STYLE, 1624W, 2-3/8 IN X 2-3/4 IN (6 CM X 7 CM), 100/CT 4CT/CASE: Brand: 3M™ TEGADERM™

## (undated) DEVICE — Device

## (undated) DEVICE — ZIMMER® STERILE DISPOSABLE TOURNIQUET CUFF WITH PROTECTIVE SLEEVE AND PLC, DUAL PORT, SINGLE BLADDER, 34 IN. (86 CM)

## (undated) DEVICE — SOLUTION IRRIG 1000ML H2O STRL BLT

## (undated) DEVICE — HANDPIECE SET WITH COAXIAL HIGH FLOW TIP AND SUCTION TUBE: Brand: INTERPULSE

## (undated) DEVICE — STERILE POLYISOPRENE POWDER-FREE SURGICAL GLOVES: Brand: PROTEXIS

## (undated) DEVICE — HANDLE LT SNAP ON ULT DURABLE LENS FOR TRUMPF ALC DISPOSABLE

## (undated) DEVICE — SOLUTION LACTATED RINGERS INJECTION USP

## (undated) DEVICE — REM POLYHESIVE ADULT PATIENT RETURN ELECTRODE: Brand: VALLEYLAB

## (undated) DEVICE — GARMENT,MEDLINE,DVT,INT,CALF,MED, GEN2: Brand: MEDLINE

## (undated) DEVICE — SOLUTION IRRIG 3000ML 0.9% SOD CHL FLX CONT 0797208] ICU MEDICAL INC]

## (undated) DEVICE — SYR 50ML LR LCK 1ML GRAD NSAF --

## (undated) DEVICE — 1200 GUARD II KIT W/5MM TUBE W/O VAC TUBE: Brand: GUARDIAN

## (undated) DEVICE — SOLIDIFIER MEDC 1200ML -- CONVERT TO 356117

## (undated) DEVICE — CUSTOM CAST PD STR

## (undated) DEVICE — 3M™ IOBAN™ 2 ANTIMICROBIAL INCISE DRAPE 6651EZ: Brand: IOBAN™ 2

## (undated) DEVICE — STRAP,POSITIONING,KNEE/BODY,FOAM,4X60": Brand: MEDLINE

## (undated) DEVICE — SUTURE ETHBND EXCEL SZ 1 L30IN NONABSORBABLE GRN L36MM CT-1 X425H

## (undated) DEVICE — INFECTION CONTROL KIT SYS

## (undated) DEVICE — T4 HOOD

## (undated) DEVICE — SUTURE VCRL SZ 1 L27IN ABSRB VLT L36MM CT-1 1/2 CIR J341H

## (undated) DEVICE — DUAL CUT SAGITTAL BLADE

## (undated) DEVICE — SUTURE ABSORBABLE BRAIDED 2-0 CT-1 27 IN UD VICRYL J259H

## (undated) DEVICE — 4-PORT MANIFOLD: Brand: NEPTUNE 2

## (undated) DEVICE — HOOD: Brand: FLYTE

## (undated) DEVICE — DRAIN KT WND 10FR RND 400ML --

## (undated) DEVICE — (D)PREP SKN CHLRAPRP APPL 26ML -- CONVERT TO ITEM 371833

## (undated) DEVICE — INTENDED FOR TISSUE SEPARATION, AND OTHER PROCEDURES THAT REQUIRE A SHARP SURGICAL BLADE TO PUNCTURE OR CUT.: Brand: BARD-PARKER ® CARBON RIB-BACK BLADES

## (undated) DEVICE — ENDO CARRY-ON PROCEDURE KIT INCLUDES ENZYMATIC SPONGE, GAUZE, BIOHAZARD LABEL, TRAY, LUBRICANT, DIRTY SCOPE LABEL, WATER LABEL, TRAY, DRAWSTRING PAD, AND DEFENDO 4-PIECE KIT.: Brand: ENDO CARRY-ON PROCEDURE KIT

## (undated) DEVICE — NEEDLE HYPO 18GA L1.5IN PNK S STL HUB POLYPR SHLD REG BVL

## (undated) DEVICE — CEMENT MIXING SYSTEM WITH FEMORAL BREAKWAY NOZZLE: Brand: REVOLUTION

## (undated) DEVICE — BANDAGE COMPR M W6INXL10YD WHT BGE VELC E MTRX HK AND LOOP

## (undated) DEVICE — YANKAUER,SMOOTH HANDLE,HIGH CAPACITY: Brand: MEDLINE INDUSTRIES, INC.

## (undated) DEVICE — CONTINU-FLO SOLUTION SET, 2 INJECTION SITES, MALE LUER LOCK ADAPTER WITH RETRACTABLE COLLAR, LARGE BORE STOPCOCK WITH ROTATING MALE LUER LOCK EXTENSION SET, 2 INJECTION SITES, MALE LUER LOCK ADAPTER WITH RETRACTABLE COLLAR: Brand: INTERLINK/CONTINU-FLO

## (undated) DEVICE — KENDALL DL ECG CABLE AND LEAD WIRE SYSTEM, 3-LEAD, SINGLE PATIENT USE: Brand: KENDALL